# Patient Record
Sex: MALE | Race: ASIAN | NOT HISPANIC OR LATINO | Employment: FULL TIME | URBAN - METROPOLITAN AREA
[De-identification: names, ages, dates, MRNs, and addresses within clinical notes are randomized per-mention and may not be internally consistent; named-entity substitution may affect disease eponyms.]

---

## 2023-06-06 ENCOUNTER — OFFICE VISIT (OUTPATIENT)
Dept: FAMILY MEDICINE CLINIC | Facility: CLINIC | Age: 48
End: 2023-06-06
Payer: COMMERCIAL

## 2023-06-06 VITALS
SYSTOLIC BLOOD PRESSURE: 122 MMHG | DIASTOLIC BLOOD PRESSURE: 70 MMHG | HEART RATE: 56 BPM | WEIGHT: 150.2 LBS | TEMPERATURE: 98.1 F | OXYGEN SATURATION: 99 % | BODY MASS INDEX: 24.14 KG/M2 | HEIGHT: 66 IN

## 2023-06-06 DIAGNOSIS — Z12.5 SCREENING FOR PROSTATE CANCER: ICD-10-CM

## 2023-06-06 DIAGNOSIS — Z00.00 ANNUAL PHYSICAL EXAM: Primary | ICD-10-CM

## 2023-06-06 DIAGNOSIS — Z13.1 SCREENING FOR DIABETES MELLITUS: ICD-10-CM

## 2023-06-06 DIAGNOSIS — Z13.220 SCREENING FOR CHOLESTEROL LEVEL: ICD-10-CM

## 2023-06-06 DIAGNOSIS — Z13.29 SCREENING FOR THYROID DISORDER: ICD-10-CM

## 2023-06-06 DIAGNOSIS — Z12.11 SCREENING FOR COLON CANCER: ICD-10-CM

## 2023-06-06 DIAGNOSIS — N52.9 ERECTILE DYSFUNCTION, UNSPECIFIED ERECTILE DYSFUNCTION TYPE: ICD-10-CM

## 2023-06-06 DIAGNOSIS — Z11.59 NEED FOR HEPATITIS C SCREENING TEST: ICD-10-CM

## 2023-06-06 DIAGNOSIS — Z13.0 SCREENING FOR IRON DEFICIENCY ANEMIA: ICD-10-CM

## 2023-06-06 PROCEDURE — 99386 PREV VISIT NEW AGE 40-64: CPT | Performed by: FAMILY MEDICINE

## 2023-06-06 RX ORDER — SILDENAFIL 100 MG/1
TABLET, FILM COATED ORAL
Qty: 10 TABLET | Refills: 0 | Status: SHIPPED | OUTPATIENT
Start: 2023-06-06 | End: 2023-06-06

## 2023-06-06 RX ORDER — SILDENAFIL 50 MG/1
TABLET, FILM COATED ORAL
Qty: 10 TABLET | Refills: 0 | Status: SHIPPED | OUTPATIENT
Start: 2023-06-06

## 2023-06-06 RX ORDER — LEVOTHYROXINE SODIUM 112 UG/1
112 TABLET ORAL DAILY
COMMUNITY
Start: 2022-07-12 | End: 2023-06-14 | Stop reason: SDUPTHER

## 2023-06-06 NOTE — PROGRESS NOTES
ADULT ANNUAL [address] [place] [address] [facility]    NAME: Perla Rowan  AGE: 57 y o  SEX: male  : 1975     DATE: 2023     Assessment and Plan:     Problem List Items Addressed This Visit    None  Visit Diagnoses     Annual physical exam    -  Primary    Screening for colon cancer        Relevant Orders    Ambulatory Referral to Gastroenterology    Erectile dysfunction, unspecified erectile dysfunction type        Relevant Medications    sildenafil (VIAGRA) 50 MG tablet    Screening for iron deficiency anemia        Relevant Orders    CBC    Screening for diabetes mellitus        Relevant Orders    Comprehensive metabolic panel    Hemoglobin A1C With EAG    Screening for cholesterol level        Relevant Orders    Lipid Panel with Direct LDL reflex    Screening for prostate cancer        Relevant Orders    PSA, Total Screen    Screening for thyroid disorder        Relevant Orders    TSH, 3rd generation with Free T4 reflex    Need for hepatitis C screening test        Relevant Orders    Hepatitis C RNA, quantitative, PCR          Immunizations and preventive care screenings were discussed with patient today  Appropriate education was printed on patient's after visit summary  Discussed risks and benefits of prostate cancer screening  We discussed the controversial history of PSA screening for prostate cancer in the [place] as well as the risk of over detection and over treatment of prostate cancer by way of PSA screening  The patient understands that PSA blood testing is an imperfect way to screen for prostate cancer and that elevated PSA levels in the blood may also be caused by infection, inflammation, prostatic trauma or manipulation, urological procedures, or by benign prostatic enlargement      The role of the digital rectal examination in prostate cancer screening was also discussed and I discussed with him that there is large interobserver variability in the findings of digital rectal examination  Counseling:  Alcohol/drug use: discussed moderation in alcohol intake, the recommendations for healthy alcohol use, and avoidance of illicit drug use  Dental Health: discussed importance of regular tooth brushing, flossing, and dental visits  Injury prevention: discussed safety/seat belts, safety helmets, smoke detectors, carbon dioxide detectors, and smoking near bedding or upholstery  Sexual health: discussed sexually transmitted diseases, partner selection, use of condoms, avoidance of unintended pregnancy, and contraceptive alternatives  · Exercise: the importance of regular exercise/physical activity was discussed  Recommend exercise 3-5 times per week for at least 30 minutes  Return in 1 year (on 6/6/2024)  Chief Complaint:     Chief Complaint   Patient presents with   • Physical Exam      History of Present Illness:     Adult Annual Physical   Patient here for a comprehensive physical exam  The patient reports problems - erectiole dysfunction  Diet and Physical Activity  · Diet/Nutrition: well balanced diet  · Exercise: vigorous cardiovascular exercise and 5-7 times a week on average  Depression Screening  PHQ-2/9 Depression Screening    Little interest or pleasure in doing things: 0 - not at all  Feeling down, depressed, or hopeless: 0 - not at all  PHQ-2 Score: 0  PHQ-2 Interpretation: Negative depression screen       General Health  · Sleep: sleeps well  · Hearing: normal - bilateral   · Vision: goes for regular eye exams  · Dental: regular dental visits   Health  · Symptoms include: none erectile dysfunction     Review of Systems:     Review of Systems   Constitutional: Negative for activity change, chills, fatigue and fever  HENT: Negative for congestion, ear pain, sinus pressure and sore throat  Eyes: Negative for redness, itching and visual disturbance     Respiratory: Negative for cough and shortness of breath  Cardiovascular: Negative for chest pain and palpitations  Gastrointestinal: Negative for abdominal pain, diarrhea and nausea  Endocrine: Negative for cold intolerance and heat intolerance  Genitourinary: Negative for dysuria, flank pain and frequency  ED   Musculoskeletal: Negative for arthralgias, back pain, gait problem and myalgias  Skin: Negative for color change  Allergic/Immunologic: Negative for environmental allergies  Neurological: Negative for dizziness, numbness and headaches  Psychiatric/Behavioral: Negative for behavioral problems and sleep disturbance  Past Medical History:     History reviewed  No pertinent past medical history     Past Surgical History:     Past Surgical History:   Procedure Laterality Date   • WISDOM TOOTH EXTRACTION        Family History:     Family History   Problem Relation Age of Onset   • Hypertension Mother    • No Known Problems Father         Cannot walk due to drinking alcohol   • No Known Problems Sister    • No Known Problems Sister    • No Known Problems Brother    • No Known Problems Son    • No Known Problems Daughter    • No Known Problems Daughter       Social History:     Social History     Socioeconomic History   • Marital status: /Civil Union     Spouse name: None   • Number of children: None   • Years of education: None   • Highest education level: None   Occupational History   • None   Tobacco Use   • Smoking status: Never     Passive exposure: Never   • Smokeless tobacco: Never   Vaping Use   • Vaping Use: Never used   Substance and Sexual Activity   • Alcohol use: Never   • Drug use: Never   • Sexual activity: None   Other Topics Concern   • None   Social History Narrative   • None     Social Determinants of Health     Financial Resource Strain: Not on file   Food Insecurity: Not on file   Transportation Needs: Not on file   Physical Activity: Not on file   Stress: Not on file   Social Connections: Not on "file   Intimate Partner Violence: Not on file   Housing Stability: Not on file      Current Medications:     Current Outpatient Medications   Medication Sig Dispense Refill   • levothyroxine 112 mcg tablet Take 112 mcg by mouth daily     • sildenafil (VIAGRA) 50 MG tablet Take 1 tab PO 1 hour prior to Sexual activity PRN  Do not exceed 1 tab per day 10 tablet 0     No current facility-administered medications for this visit  Allergies:     No Known Allergies   Physical Exam:     /70 (BP Location: Left arm, Patient Position: Sitting, Cuff Size: Adult)   Pulse 56   Temp 98 1 °F (36 7 °C)   Ht 5' 6\" (1 676 m)   Wt 68 1 kg (150 lb 3 2 oz)   SpO2 99%   BMI 24 24 kg/m²     Physical Exam  Vitals reviewed  Constitutional:       General: He is not in acute distress  Appearance: He is well-developed  He is not diaphoretic  HENT:      Head: Normocephalic and atraumatic  No right periorbital erythema or left periorbital erythema  Right Ear: Tympanic membrane normal  No decreased hearing noted  Left Ear: Tympanic membrane normal  No decreased hearing noted  Nose: Nose normal       Right Sinus: No maxillary sinus tenderness or frontal sinus tenderness  Left Sinus: No maxillary sinus tenderness or frontal sinus tenderness  Mouth/Throat:      Lips: Pink  Mouth: Mucous membranes are moist       Pharynx: No oropharyngeal exudate  Tonsils: No tonsillar exudate or tonsillar abscesses  Eyes:      General: Lids are normal       Extraocular Movements: Extraocular movements intact  Conjunctiva/sclera: Conjunctivae normal       Pupils: Pupils are equal, round, and reactive to light  Neck:      Thyroid: No thyroid mass  Trachea: Trachea normal    Cardiovascular:      Rate and Rhythm: Normal rate and regular rhythm  Pulses: Normal pulses  Heart sounds: Normal heart sounds  No murmur heard  No friction rub  No gallop     Pulmonary:      Effort: Pulmonary " effort is normal  No respiratory distress  Breath sounds: Normal breath sounds  No wheezing or rales  Abdominal:      General: Bowel sounds are normal  There is no distension  Palpations: Abdomen is soft  There is no mass  Tenderness: There is no abdominal tenderness  There is no guarding  Musculoskeletal:         General: Normal range of motion  Cervical back: Normal range of motion and neck supple  Skin:     General: Skin is warm and dry  Coloration: Skin is not pale  Findings: No erythema or rash  Neurological:      Mental Status: He is alert and oriented to person, place, and time  Cranial Nerves: No cranial nerve deficit  Coordination: Coordination normal    Psychiatric:         Attention and Perception: Attention and perception normal          Mood and Affect: Mood and affect normal          Speech: Speech normal          Behavior: Behavior normal          Thought Content:  Thought content normal          Cognition and Memory: Cognition and memory normal          Judgment: Judgment normal           Ihab DO ESE Hernández [address]

## 2023-06-08 ENCOUNTER — TELEPHONE (OUTPATIENT)
Dept: FAMILY MEDICINE CLINIC | Facility: CLINIC | Age: 48
End: 2023-06-08

## 2023-06-12 DIAGNOSIS — R94.6 ABNORMAL THYROID FUNCTION TEST: Primary | ICD-10-CM

## 2023-06-13 LAB
ALBUMIN SERPL-MCNC: 4.5 G/DL (ref 3.6–5.1)
ALBUMIN/GLOB SERPL: 1.7 (CALC) (ref 1–2.5)
ALP SERPL-CCNC: 60 U/L (ref 36–130)
ALT SERPL-CCNC: 14 U/L (ref 9–46)
AST SERPL-CCNC: 16 U/L (ref 10–40)
BILIRUB SERPL-MCNC: 0.5 MG/DL (ref 0.2–1.2)
BUN SERPL-MCNC: 15 MG/DL (ref 7–25)
BUN/CREAT SERPL: NORMAL (CALC) (ref 6–22)
CALCIUM SERPL-MCNC: 9.3 MG/DL (ref 8.6–10.3)
CHLORIDE SERPL-SCNC: 106 MMOL/L (ref 98–110)
CHOLEST SERPL-MCNC: 169 MG/DL
CHOLEST/HDLC SERPL: 3.4 (CALC)
CO2 SERPL-SCNC: 27 MMOL/L (ref 20–32)
CREAT SERPL-MCNC: 0.94 MG/DL (ref 0.6–1.29)
ERYTHROCYTE [DISTWIDTH] IN BLOOD BY AUTOMATED COUNT: 15.2 % (ref 11–15)
EST. AVERAGE GLUCOSE BLD GHB EST-MCNC: 114 MG/DL
EST. AVERAGE GLUCOSE BLD GHB EST-SCNC: 6.3 MMOL/L
GFR/BSA.PRED SERPLBLD CYS-BASED-ARV: 101 ML/MIN/1.73M2
GLOBULIN SER CALC-MCNC: 2.6 G/DL (CALC) (ref 1.9–3.7)
GLUCOSE SERPL-MCNC: 90 MG/DL (ref 65–99)
HBA1C MFR BLD: 5.6 % OF TOTAL HGB
HCT VFR BLD AUTO: 39.8 % (ref 38.5–50)
HCV RNA SERPL NAA+PROBE-ACNC: NORMAL IU/ML
HCV RNA SERPL NAA+PROBE-LOG IU: NORMAL LOG IU/ML
HDLC SERPL-MCNC: 49 MG/DL
HGB BLD-MCNC: 13.4 G/DL (ref 13.2–17.1)
LDLC SERPL CALC-MCNC: 104 MG/DL (CALC)
MCH RBC QN AUTO: 26.2 PG (ref 27–33)
MCHC RBC AUTO-ENTMCNC: 33.7 G/DL (ref 32–36)
MCV RBC AUTO: 77.7 FL (ref 80–100)
NONHDLC SERPL-MCNC: 120 MG/DL (CALC)
PLATELET # BLD AUTO: 238 THOUSAND/UL (ref 140–400)
PMV BLD REES-ECKER: 11.2 FL (ref 7.5–12.5)
POTASSIUM SERPL-SCNC: 4.4 MMOL/L (ref 3.5–5.3)
PROT SERPL-MCNC: 7.1 G/DL (ref 6.1–8.1)
PSA SERPL-MCNC: 0.81 NG/ML
RBC # BLD AUTO: 5.12 MILLION/UL (ref 4.2–5.8)
SODIUM SERPL-SCNC: 140 MMOL/L (ref 135–146)
T4 FREE SERPL-MCNC: 1.7 NG/DL (ref 0.8–1.8)
TRIGL SERPL-MCNC: 72 MG/DL
TSH SERPL-ACNC: 0.08 MIU/L (ref 0.4–4.5)
WBC # BLD AUTO: 4.6 THOUSAND/UL (ref 3.8–10.8)

## 2023-06-14 DIAGNOSIS — E03.9 HYPOTHYROIDISM, UNSPECIFIED TYPE: Primary | ICD-10-CM

## 2023-06-14 RX ORDER — LEVOTHYROXINE SODIUM 0.1 MG/1
100 TABLET ORAL DAILY
Qty: 30 TABLET | Refills: 2 | Status: SHIPPED | OUTPATIENT
Start: 2023-06-14 | End: 2023-07-06

## 2023-06-15 ENCOUNTER — TELEPHONE (OUTPATIENT)
Dept: FAMILY MEDICINE CLINIC | Facility: CLINIC | Age: 48
End: 2023-06-15

## 2023-06-15 DIAGNOSIS — E03.9 HYPOTHYROIDISM, UNSPECIFIED TYPE: Primary | ICD-10-CM

## 2023-06-15 NOTE — TELEPHONE ENCOUNTER
Spoke w/pt  Pt stated that he was taking 112 mcg and that Dr Tiara Gandara already lowered it to 100 mcg yesterday and he already started on the 100 mcg

## 2023-06-15 NOTE — TELEPHONE ENCOUNTER
----- Message from Mark Banks DO sent at 6/14/2023  7:01 PM EDT -----  No, please have him stop his current dose of levothyroxine  Please confirm what exact dose this is first   He will need to start a lower dose  Thank you   ----- Message -----  From: Eileen Garrido RN  Sent: 6/14/2023   4:22 PM EDT  To: Darryl Hernández DO    Pt is informed  He just asked if he should continue taking his thyroid medication as currently prescribed   States he takes levothyroxine

## 2023-06-16 ENCOUNTER — APPOINTMENT (OUTPATIENT)
Dept: LAB | Facility: CLINIC | Age: 48
End: 2023-06-16
Payer: COMMERCIAL

## 2023-06-16 DIAGNOSIS — R94.6 ABNORMAL THYROID FUNCTION TEST: ICD-10-CM

## 2023-06-16 LAB
T4 FREE SERPL-MCNC: 1.47 NG/DL (ref 0.61–1.12)
TSH SERPL DL<=0.05 MIU/L-ACNC: 0.05 UIU/ML (ref 0.45–4.5)

## 2023-06-16 PROCEDURE — 84443 ASSAY THYROID STIM HORMONE: CPT

## 2023-06-16 PROCEDURE — 86376 MICROSOMAL ANTIBODY EACH: CPT

## 2023-06-16 PROCEDURE — 36415 COLL VENOUS BLD VENIPUNCTURE: CPT

## 2023-06-16 PROCEDURE — 86800 THYROGLOBULIN ANTIBODY: CPT

## 2023-06-16 PROCEDURE — 84439 ASSAY OF FREE THYROXINE: CPT

## 2023-06-17 DIAGNOSIS — E05.90 HYPERTHYROIDISM: Primary | ICD-10-CM

## 2023-06-17 LAB
THYROGLOB AB SERPL-ACNC: <1 IU/ML (ref 0–0.9)
THYROPEROXIDASE AB SERPL-ACNC: 158 IU/ML (ref 0–34)

## 2023-06-21 ENCOUNTER — OFFICE VISIT (OUTPATIENT)
Dept: FAMILY MEDICINE CLINIC | Facility: CLINIC | Age: 48
End: 2023-06-21
Payer: COMMERCIAL

## 2023-06-21 VITALS
DIASTOLIC BLOOD PRESSURE: 78 MMHG | OXYGEN SATURATION: 99 % | WEIGHT: 149.6 LBS | SYSTOLIC BLOOD PRESSURE: 104 MMHG | BODY MASS INDEX: 24.04 KG/M2 | HEIGHT: 66 IN | HEART RATE: 59 BPM | TEMPERATURE: 97.9 F

## 2023-06-21 DIAGNOSIS — E05.90 HYPERTHYROIDISM: Primary | ICD-10-CM

## 2023-06-21 PROCEDURE — 99213 OFFICE O/P EST LOW 20 MIN: CPT | Performed by: FAMILY MEDICINE

## 2023-06-21 NOTE — PROGRESS NOTES
Assessment/Plan:   1  Hyperthyroidism  Reviewed patient's blood work with him in depth  At this time, his TSH still appeared very low  He did have thyroid antibody testing which did was positive as well  At this time, he has been tolerating his lower dose of levothyroxine  Given his positive antibody testing, will refer patient to endocrinology for further evaluation  Contact information was given to him today  He does not appear to have any emergent symptoms  Follow-up if any symptoms worsen  - TSH, 3rd generation with Free T4 reflex; Future           Diagnoses and all orders for this visit:    Hyperthyroidism  -     TSH, 3rd generation with Free T4 reflex; Future          Subjective:       Chief Complaint   Patient presents with   • Follow-up     Discuss labs      Patient ID: Percy Cunningham is a 57 y o  male presents today for a follow-up to review his blood work  He states that he did lower his dose of levothyroxine  He states that he has had this problem in the past where he all of a sudden developed very low TSH  He currently has been having mild joint pains  He states that he usually notices this when he has a change in his thyroid medications  HPI    Review of Systems   Constitutional: Negative for activity change, chills, fatigue and fever  HENT: Negative for congestion, ear pain, sinus pressure and sore throat  Eyes: Negative for redness, itching and visual disturbance  Respiratory: Negative for cough and shortness of breath  Cardiovascular: Negative for chest pain and palpitations  Gastrointestinal: Negative for abdominal pain, diarrhea and nausea  Endocrine: Negative for cold intolerance and heat intolerance  Genitourinary: Negative for dysuria, flank pain and frequency  Musculoskeletal: Negative for arthralgias, back pain, gait problem and myalgias  Skin: Negative for color change  Allergic/Immunologic: Negative for environmental allergies     Neurological: Negative for "dizziness, numbness and headaches  Psychiatric/Behavioral: Negative for behavioral problems and sleep disturbance  The following portions of the patient's history were reviewed and updated as appropriate : past family history, past medical history, past social history and past surgical history  Current Outpatient Medications:   •  levothyroxine 100 mcg tablet, Take 1 tablet (100 mcg total) by mouth daily, Disp: 30 tablet, Rfl: 2  •  sildenafil (VIAGRA) 50 MG tablet, Take 1 tab PO 1 hour prior to Sexual activity PRN  Do not exceed 1 tab per day, Disp: 10 tablet, Rfl: 0         Objective:         Vitals:    06/21/23 0917   BP: 104/78   BP Location: Left arm   Patient Position: Sitting   Cuff Size: Adult   Pulse: 59   Temp: 97 9 °F (36 6 °C)   SpO2: 99%   Weight: 67 9 kg (149 lb 9 6 oz)   Height: 5' 6\" (1 676 m)     Physical Exam  Vitals reviewed  Constitutional:       Appearance: He is well-developed  HENT:      Head: Normocephalic and atraumatic  Nose: Nose normal       Mouth/Throat:      Pharynx: No oropharyngeal exudate  Eyes:      General: No scleral icterus  Right eye: No discharge  Left eye: No discharge  Pupils: Pupils are equal, round, and reactive to light  Neck:      Trachea: No tracheal deviation  Cardiovascular:      Rate and Rhythm: Normal rate and regular rhythm  Pulses:           Dorsalis pedis pulses are 2+ on the right side and 2+ on the left side  Posterior tibial pulses are 2+ on the right side and 2+ on the left side  Heart sounds: Normal heart sounds  No murmur heard  No friction rub  No gallop  Pulmonary:      Effort: Pulmonary effort is normal  No respiratory distress  Breath sounds: Normal breath sounds  No wheezing or rales  Abdominal:      General: Bowel sounds are normal  There is no distension  Palpations: Abdomen is soft  Tenderness: There is no abdominal tenderness  There is no guarding or rebound   " Musculoskeletal:         General: Normal range of motion  Cervical back: Normal range of motion and neck supple  Lymphadenopathy:      Head:      Right side of head: No submental or submandibular adenopathy  Left side of head: No submental or submandibular adenopathy  Cervical: No cervical adenopathy  Right cervical: No superficial, deep or posterior cervical adenopathy  Left cervical: No superficial, deep or posterior cervical adenopathy  Skin:     General: Skin is warm and dry  Findings: No erythema  Neurological:      Mental Status: He is alert and oriented to person, place, and time  Cranial Nerves: No cranial nerve deficit  Sensory: No sensory deficit  Psychiatric:         Mood and Affect: Mood is not anxious or depressed  Speech: Speech normal          Behavior: Behavior normal          Thought Content:  Thought content normal          Judgment: Judgment normal

## 2023-07-06 DIAGNOSIS — E03.9 HYPOTHYROIDISM, UNSPECIFIED TYPE: ICD-10-CM

## 2023-07-06 RX ORDER — LEVOTHYROXINE SODIUM 0.1 MG/1
TABLET ORAL
Qty: 90 TABLET | Refills: 1 | Status: SHIPPED | OUTPATIENT
Start: 2023-07-06

## 2023-07-26 ENCOUNTER — CONSULT (OUTPATIENT)
Dept: ENDOCRINOLOGY | Facility: CLINIC | Age: 48
End: 2023-07-26
Payer: COMMERCIAL

## 2023-07-26 ENCOUNTER — LAB (OUTPATIENT)
Dept: LAB | Facility: CLINIC | Age: 48
End: 2023-07-26
Payer: COMMERCIAL

## 2023-07-26 VITALS
WEIGHT: 146.13 LBS | SYSTOLIC BLOOD PRESSURE: 104 MMHG | HEIGHT: 66 IN | DIASTOLIC BLOOD PRESSURE: 80 MMHG | BODY MASS INDEX: 23.48 KG/M2 | HEART RATE: 56 BPM

## 2023-07-26 DIAGNOSIS — E05.90 HYPERTHYROIDISM: ICD-10-CM

## 2023-07-26 DIAGNOSIS — E06.3 HYPOTHYROIDISM DUE TO HASHIMOTO'S THYROIDITIS: Primary | ICD-10-CM

## 2023-07-26 DIAGNOSIS — E06.3 HYPOTHYROIDISM DUE TO HASHIMOTO'S THYROIDITIS: ICD-10-CM

## 2023-07-26 DIAGNOSIS — K59.00 CONSTIPATION, UNSPECIFIED CONSTIPATION TYPE: ICD-10-CM

## 2023-07-26 DIAGNOSIS — N52.9 ERECTILE DYSFUNCTION, UNSPECIFIED ERECTILE DYSFUNCTION TYPE: ICD-10-CM

## 2023-07-26 DIAGNOSIS — E03.8 HYPOTHYROIDISM DUE TO HASHIMOTO'S THYROIDITIS: Primary | ICD-10-CM

## 2023-07-26 DIAGNOSIS — E03.8 HYPOTHYROIDISM DUE TO HASHIMOTO'S THYROIDITIS: ICD-10-CM

## 2023-07-26 LAB
T4 FREE SERPL-MCNC: 1 NG/DL (ref 0.61–1.12)
T4 FREE SERPL-MCNC: 1.05 NG/DL (ref 0.61–1.12)
TSH SERPL DL<=0.05 MIU/L-ACNC: 0.4 UIU/ML (ref 0.45–4.5)

## 2023-07-26 PROCEDURE — 86364 TISS TRNSGLTMNASE EA IG CLAS: CPT

## 2023-07-26 PROCEDURE — 82784 ASSAY IGA/IGD/IGG/IGM EACH: CPT

## 2023-07-26 PROCEDURE — 36415 COLL VENOUS BLD VENIPUNCTURE: CPT

## 2023-07-26 PROCEDURE — 86231 EMA EACH IG CLASS: CPT

## 2023-07-26 PROCEDURE — 99244 OFF/OP CNSLTJ NEW/EST MOD 40: CPT | Performed by: INTERNAL MEDICINE

## 2023-07-26 PROCEDURE — 86258 DGP ANTIBODY EACH IG CLASS: CPT

## 2023-07-26 PROCEDURE — 84439 ASSAY OF FREE THYROXINE: CPT

## 2023-07-26 PROCEDURE — 84443 ASSAY THYROID STIM HORMONE: CPT

## 2023-07-26 RX ORDER — SILDENAFIL 50 MG/1
TABLET, FILM COATED ORAL
Qty: 10 TABLET | Refills: 0 | Status: SHIPPED | OUTPATIENT
Start: 2023-07-26

## 2023-07-26 NOTE — ASSESSMENT & PLAN NOTE
Thyroid hormone requirements are lower due to weight loss of about 20 pounds however TSH has not been stable for the past 2 years. For now repeat thyroid function test as she has been on a consistent dose for at least 5 to 6 weeks. Also screen for celiac disease.   Further management will depend on the results

## 2023-07-26 NOTE — PROGRESS NOTES
Charlie Ramsey 57 y.o. male MRN: [id number]    Encounter: [id number]      Assessment/Plan     Problem List Items Addressed This Visit        Endocrine    Hypothyroidism due to Hashimoto's thyroiditis - Primary     Thyroid hormone requirements are lower due to weight loss of about 20 pounds however TSH has not been stable for the past 2 years. For now repeat thyroid function test as she has been on a consistent dose for at least 5 to 6 weeks. Also screen for celiac disease. Further management will depend on the results         Relevant Orders    T4, free Lab Collect    TSH, 3rd generation Lab Collect       Other    Constipation     Screen for celiac disease         Relevant Orders    Celiac Disease Antibody Profile    Erectile dysfunction     Will check testosterone next visit        Other Visit Diagnoses     Hyperthyroidism            CC:   Low TSHLow tsh    History of Present Illness     HPI:  79-year-old male referred here for evaluation of low TSH. He was initially diagnosed with hypothyroidism 2013  - started on LT4 and dose has been gradually increased until he was taking 112 mcg daily for the past year or so. TSH in the past 2 years have ranged between 2-6. Labs in June showed a suppressed TSH and levothyroxine dose was decreased to 100 mcg daily 5 weeks back. Takes it regularly and properly and eats 30 mins - 1 hours before breakfast    Stopped etoh and started working out and intermittent fasting and last 20 lbs  In the past 8 months     No palpitations   Sleeping well   Sometimes feel shaky when he has not eaten   Someone feels on edge     C/o constipation - takes fiber and miralax     Muscle aches     ED 8-9 months ,no loss of libido , sometimes gets morning erections   marathon runner       Review of Systems    Historical Information   No past medical history on file.   Past Surgical History:   Procedure Laterality Date   • WISDOM TOOTH EXTRACTION       Social History   Social History Substance and Sexual Activity   Alcohol Use Never     Social History     Substance and Sexual Activity   Drug Use Never     Social History     Tobacco Use   Smoking Status Never   • Passive exposure: Never   Smokeless Tobacco Never     Family History:   Family History   Problem Relation Age of Onset   • Hypertension Mother    • No Known Problems Father         Cannot walk due to drinking alcohol   • Thyroid disease unspecified Sister    • No Known Problems Sister    • No Known Problems Brother    • No Known Problems Daughter    • No Known Problems Daughter    • No Known Problems Son        Meds/Allergies   Current Outpatient Medications   Medication Sig Dispense Refill   • levothyroxine 100 mcg tablet TAKE 1 TABLET BY MOUTH EVERY DAY 90 tablet 1   • sildenafil (VIAGRA) 50 MG tablet Take 1 tab PO 1 hour prior to Sexual activity PRN. Do not exceed 1 tab per day 10 tablet 0     No current facility-administered medications for this visit. No Known Allergies    Objective   Vitals: Blood pressure 104/80, pulse 56, height 5' 6" (1.676 m), weight 66.3 kg (146 lb 2 oz). Physical Exam  Vitals reviewed. Constitutional:       General: He is not in acute distress. Appearance: Normal appearance. He is not ill-appearing, toxic-appearing or diaphoretic. HENT:      Head: Normocephalic and atraumatic. Eyes:      General: No scleral icterus. Extraocular Movements: Extraocular movements intact. Cardiovascular:      Rate and Rhythm: Normal rate and regular rhythm. Heart sounds: Normal heart sounds. No murmur heard. Pulmonary:      Effort: Pulmonary effort is normal. No respiratory distress. Breath sounds: Normal breath sounds. No wheezing or rales. Abdominal:      General: There is no distension. Palpations: Abdomen is soft. Tenderness: There is no abdominal tenderness. Musculoskeletal:      Cervical back: Neck supple. Right lower leg: No edema. Left lower leg: No edema. Lymphadenopathy:      Cervical: No cervical adenopathy. Skin:     General: Skin is warm and dry. Neurological:      General: No focal deficit present. Mental Status: He is alert and oriented to person, place, and time. Gait: Gait normal.   Psychiatric:         Mood and Affect: Mood normal.         Behavior: Behavior normal.         Thought Content: Thought content normal.         Judgment: Judgment normal.         The history was obtained from the review of the chart, patient. Lab Results:   Lab Results   Component Value Date/Time    TSH 3RD GENERATON 0.048 (L) 06/16/2023 08:14 AM    TSH W/RFX TO FREE T4 0.08 (L) 06/12/2023 08:25 AM    Free T4 1.47 (H) 06/16/2023 08:14 AM    Free t4 1.7 06/12/2023 08:25 AM     X  Component 12/28/22  07/09/22  10/14/21  08/24/21    Thyroid Stimulating Hormone 1.98 4.77 High  2.15 6.94 High        Imaging Studies:     Portions of the record may have been created with voice recognition software. Occasional wrong word or "sound a like" substitutions may have occurred due to the inherent limitations of voice recognition software. Read the chart carefully and recognize, using context, where substitutions have occurred.

## 2023-07-27 DIAGNOSIS — E06.3 HYPOTHYROIDISM DUE TO HASHIMOTO'S THYROIDITIS: Primary | ICD-10-CM

## 2023-07-27 DIAGNOSIS — E03.8 HYPOTHYROIDISM DUE TO HASHIMOTO'S THYROIDITIS: Primary | ICD-10-CM

## 2023-07-27 NOTE — RESULT ENCOUNTER NOTE
Please call the patient regarding labs -TSH has improved, continue levothyroxine at current dose for now, celiac screen is pending  Repeat TSH and free T4 in the next 6 to 8 weeks-Will order labs

## 2023-07-29 LAB
ENDOMYSIUM IGA SER QL: NEGATIVE
GLIADIN PEPTIDE IGA SER-ACNC: 4 UNITS (ref 0–19)
GLIADIN PEPTIDE IGG SER-ACNC: 1 UNITS (ref 0–19)
IGA SERPL-MCNC: 165 MG/DL (ref 90–386)
TTG IGA SER-ACNC: <2 U/ML (ref 0–3)
TTG IGG SER-ACNC: 3 U/ML (ref 0–5)

## 2023-09-01 ENCOUNTER — LAB (OUTPATIENT)
Dept: LAB | Facility: CLINIC | Age: 48
End: 2023-09-01
Payer: COMMERCIAL

## 2023-09-01 DIAGNOSIS — E03.8 HYPOTHYROIDISM DUE TO HASHIMOTO'S THYROIDITIS: ICD-10-CM

## 2023-09-01 DIAGNOSIS — E06.3 HYPOTHYROIDISM DUE TO HASHIMOTO'S THYROIDITIS: ICD-10-CM

## 2023-09-01 DIAGNOSIS — E03.8 HYPOTHYROIDISM DUE TO HASHIMOTO'S THYROIDITIS: Primary | ICD-10-CM

## 2023-09-01 DIAGNOSIS — E06.3 HYPOTHYROIDISM DUE TO HASHIMOTO'S THYROIDITIS: Primary | ICD-10-CM

## 2023-09-01 LAB
T4 FREE SERPL-MCNC: 1.01 NG/DL (ref 0.61–1.12)
TSH SERPL DL<=0.05 MIU/L-ACNC: 0.66 UIU/ML (ref 0.45–4.5)

## 2023-09-01 PROCEDURE — 84439 ASSAY OF FREE THYROXINE: CPT

## 2023-09-01 PROCEDURE — 36415 COLL VENOUS BLD VENIPUNCTURE: CPT

## 2023-09-01 PROCEDURE — 84443 ASSAY THYROID STIM HORMONE: CPT

## 2023-09-01 NOTE — RESULT ENCOUNTER NOTE
Tsh improved , continue levothyroxine at current dose , repeat tsh and free t4 in 3 months .  Labs ordered

## 2023-10-10 DIAGNOSIS — E03.9 HYPOTHYROIDISM, UNSPECIFIED TYPE: ICD-10-CM

## 2023-10-10 RX ORDER — LEVOTHYROXINE SODIUM 0.1 MG/1
100 TABLET ORAL DAILY
Qty: 90 TABLET | Refills: 0 | Status: SHIPPED | OUTPATIENT
Start: 2023-10-10

## 2024-01-05 ENCOUNTER — OFFICE VISIT (OUTPATIENT)
Dept: ENDOCRINOLOGY | Facility: CLINIC | Age: 49
End: 2024-01-05
Payer: COMMERCIAL

## 2024-01-05 ENCOUNTER — APPOINTMENT (OUTPATIENT)
Dept: LAB | Facility: CLINIC | Age: 49
End: 2024-01-05
Payer: COMMERCIAL

## 2024-01-05 VITALS
HEIGHT: 66 IN | HEART RATE: 61 BPM | BODY MASS INDEX: 23.66 KG/M2 | DIASTOLIC BLOOD PRESSURE: 62 MMHG | OXYGEN SATURATION: 98 % | SYSTOLIC BLOOD PRESSURE: 98 MMHG | WEIGHT: 147.2 LBS

## 2024-01-05 DIAGNOSIS — E06.3 HYPOTHYROIDISM DUE TO HASHIMOTO'S THYROIDITIS: Primary | ICD-10-CM

## 2024-01-05 DIAGNOSIS — M79.10 MYALGIA: ICD-10-CM

## 2024-01-05 DIAGNOSIS — E03.8 HYPOTHYROIDISM DUE TO HASHIMOTO'S THYROIDITIS: Primary | ICD-10-CM

## 2024-01-05 PROBLEM — R53.83 OTHER FATIGUE: Status: ACTIVE | Noted: 2024-01-05

## 2024-01-05 LAB
25(OH)D3 SERPL-MCNC: 27 NG/ML (ref 30–100)
T4 FREE SERPL-MCNC: 1 NG/DL (ref 0.61–1.12)
TSH SERPL DL<=0.05 MIU/L-ACNC: 1.79 UIU/ML (ref 0.45–4.5)

## 2024-01-05 PROCEDURE — 99214 OFFICE O/P EST MOD 30 MIN: CPT | Performed by: INTERNAL MEDICINE

## 2024-01-05 PROCEDURE — 82306 VITAMIN D 25 HYDROXY: CPT | Performed by: INTERNAL MEDICINE

## 2024-01-05 PROCEDURE — 84439 ASSAY OF FREE THYROXINE: CPT | Performed by: INTERNAL MEDICINE

## 2024-01-05 PROCEDURE — 84443 ASSAY THYROID STIM HORMONE: CPT | Performed by: INTERNAL MEDICINE

## 2024-01-05 PROCEDURE — 36415 COLL VENOUS BLD VENIPUNCTURE: CPT | Performed by: INTERNAL MEDICINE

## 2024-01-05 NOTE — PROGRESS NOTES
Bryn Conroy 48 y.o. male MRN: [id number]    Encounter: [id number]      Assessment/Plan   Problem List Items Addressed This Visit          Endocrine    Hypothyroidism due to Hashimoto's thyroiditis - Primary     Will repeat thyroid function tests now-depending on the labs he may need adjustment in his dose         Relevant Orders    TSH, 3rd generation    T4, free       Other    Myalgia     Will check vitamin D 25-hydroxy         Relevant Orders    Vitamin D 25 hydroxy      CC:   Hypothyroidism    History of Present Illness     HPI:  48-year-old male with hypothyroidism due to Hashimoto thyroiditis seen in follow-up-      He is currently on LT4 100 MCG DAILY and has been taking it regularly and properly  Weight stable   C/o skin itching   No OTC supplementations   Not sleeping well for the past couple of weeks   No palpitations   Constipation   Complains of occasional arthralgias and myalgias    Review of Systems    Historical Information   History reviewed. No pertinent past medical history.  Past Surgical History:   Procedure Laterality Date    WISDOM TOOTH EXTRACTION       Social History   Social History     Substance and Sexual Activity   Alcohol Use Never     Social History     Substance and Sexual Activity   Drug Use Never     Social History     Tobacco Use   Smoking Status Never    Passive exposure: Never   Smokeless Tobacco Never     Family History:   Family History   Problem Relation Age of Onset    Hypertension Mother     No Known Problems Father         Cannot walk due to drinking alcohol    Thyroid disease unspecified Sister     No Known Problems Sister     No Known Problems Brother     No Known Problems Daughter     No Known Problems Daughter     No Known Problems Son        Meds/Allergies   Current Outpatient Medications   Medication Sig Dispense Refill    levothyroxine 100 mcg tablet Take 1 tablet (100 mcg total) by mouth daily 90 tablet 0    sildenafil (VIAGRA) 50 MG tablet Take 1 tab PO 1 hour  "prior to Sexual activity PRN.  Do not exceed 1 tab per day 10 tablet 0     No current facility-administered medications for this visit.     No Known Allergies    Objective   Vitals: Blood pressure 98/62, pulse 61, height 5' 6\" (1.676 m), weight 66.8 kg (147 lb 3.2 oz), SpO2 98%.    Physical Exam  Vitals reviewed.   Constitutional:       General: He is not in acute distress.     Appearance: Normal appearance. He is not ill-appearing, toxic-appearing or diaphoretic.   HENT:      Head: Normocephalic and atraumatic.   Eyes:      General: No scleral icterus.     Extraocular Movements: Extraocular movements intact.   Cardiovascular:      Rate and Rhythm: Normal rate and regular rhythm.      Heart sounds: Normal heart sounds. No murmur heard.  Pulmonary:      Effort: Pulmonary effort is normal. No respiratory distress.      Breath sounds: Normal breath sounds. No wheezing or rales.   Abdominal:      General: There is no distension.      Palpations: Abdomen is soft.      Tenderness: There is no abdominal tenderness.   Musculoskeletal:      Cervical back: Neck supple.      Right lower leg: No edema.      Left lower leg: No edema.   Lymphadenopathy:      Cervical: No cervical adenopathy.   Skin:     General: Skin is warm and dry.   Neurological:      General: No focal deficit present.      Mental Status: He is alert and oriented to person, place, and time.      Gait: Gait normal.   Psychiatric:         Mood and Affect: Mood normal.         Behavior: Behavior normal.         Thought Content: Thought content normal.         Judgment: Judgment normal.         The history was obtained from the review of the chart, patient.    Lab Results:   Lab Results   Component Value Date/Time    TSH 3RD GENERATON 0.657 09/01/2023 07:35 AM    TSH 3RD GENERATON 0.399 (L) 07/26/2023 10:05 AM    TSH 3RD GENERATON 0.048 (L) 06/16/2023 08:14 AM    TSH W/RFX TO FREE T4 0.08 (L) 06/12/2023 08:25 AM    Free T4 1.01 09/01/2023 07:35 AM    Free T4 1.05 " "07/26/2023 10:05 AM    Free T4 1.00 07/26/2023 10:05 AM       Imaging Studies:       I have personally reviewed pertinent reports.      Portions of the record may have been created with voice recognition software. Occasional wrong word or \"sound a like\" substitutions may have occurred due to the inherent limitations of voice recognition software. Read the chart carefully and recognize, using context, where substitutions have occurred.    "

## 2024-01-07 NOTE — RESULT ENCOUNTER NOTE
Please call the patient regarding labs -TSH normalized-continue levothyroxine at current dose-low vitamin D-start vitamin D3 2000 international units daily

## 2024-01-08 DIAGNOSIS — E03.9 HYPOTHYROIDISM, UNSPECIFIED TYPE: ICD-10-CM

## 2024-01-08 RX ORDER — LEVOTHYROXINE SODIUM 0.1 MG/1
100 TABLET ORAL DAILY
Qty: 90 TABLET | Refills: 0 | Status: SHIPPED | OUTPATIENT
Start: 2024-01-08

## 2024-01-08 NOTE — TELEPHONE ENCOUNTER
----- Message from Kemi Tripathi MD sent at 1/7/2024  5:05 PM EST -----  Please call the patient regarding labs -TSH normalized-continue levothyroxine at current dose-low vitamin D-start vitamin D3 2000 international units daily

## 2024-03-27 ENCOUNTER — OFFICE VISIT (OUTPATIENT)
Dept: FAMILY MEDICINE CLINIC | Facility: CLINIC | Age: 49
End: 2024-03-27
Payer: COMMERCIAL

## 2024-03-27 ENCOUNTER — APPOINTMENT (OUTPATIENT)
Dept: LAB | Facility: CLINIC | Age: 49
End: 2024-03-27
Payer: COMMERCIAL

## 2024-03-27 VITALS
TEMPERATURE: 98 F | DIASTOLIC BLOOD PRESSURE: 68 MMHG | HEIGHT: 67 IN | WEIGHT: 151 LBS | RESPIRATION RATE: 16 BRPM | BODY MASS INDEX: 23.7 KG/M2 | OXYGEN SATURATION: 98 % | SYSTOLIC BLOOD PRESSURE: 100 MMHG | HEART RATE: 49 BPM

## 2024-03-27 DIAGNOSIS — E55.9 VITAMIN D INSUFFICIENCY: ICD-10-CM

## 2024-03-27 DIAGNOSIS — Z12.11 COLON CANCER SCREENING: ICD-10-CM

## 2024-03-27 DIAGNOSIS — R71.8 LOW MEAN CORPUSCULAR VOLUME (MCV): ICD-10-CM

## 2024-03-27 DIAGNOSIS — Z00.00 ANNUAL PHYSICAL EXAM: ICD-10-CM

## 2024-03-27 DIAGNOSIS — Z12.5 SCREENING FOR PROSTATE CANCER: ICD-10-CM

## 2024-03-27 DIAGNOSIS — Z13.1 SCREENING FOR DIABETES MELLITUS: ICD-10-CM

## 2024-03-27 DIAGNOSIS — L50.9 URTICARIA: ICD-10-CM

## 2024-03-27 DIAGNOSIS — Z23 ENCOUNTER FOR IMMUNIZATION: Primary | ICD-10-CM

## 2024-03-27 DIAGNOSIS — Z13.220 SCREENING FOR CHOLESTEROL LEVEL: ICD-10-CM

## 2024-03-27 LAB
25(OH)D3 SERPL-MCNC: 45.1 NG/ML (ref 30–100)
ALBUMIN SERPL BCP-MCNC: 4.3 G/DL (ref 3.5–5)
ALP SERPL-CCNC: 45 U/L (ref 34–104)
ALT SERPL W P-5'-P-CCNC: 17 U/L (ref 7–52)
ANION GAP SERPL CALCULATED.3IONS-SCNC: 6 MMOL/L (ref 4–13)
AST SERPL W P-5'-P-CCNC: 16 U/L (ref 13–39)
BILIRUB SERPL-MCNC: 0.62 MG/DL (ref 0.2–1)
BUN SERPL-MCNC: 13 MG/DL (ref 5–25)
CALCIUM SERPL-MCNC: 9.1 MG/DL (ref 8.4–10.2)
CHLORIDE SERPL-SCNC: 105 MMOL/L (ref 96–108)
CHOLEST SERPL-MCNC: 173 MG/DL
CO2 SERPL-SCNC: 30 MMOL/L (ref 21–32)
CREAT SERPL-MCNC: 0.83 MG/DL (ref 0.6–1.3)
ERYTHROCYTE [DISTWIDTH] IN BLOOD BY AUTOMATED COUNT: 15.5 % (ref 11.6–15.1)
EST. AVERAGE GLUCOSE BLD GHB EST-MCNC: 120 MG/DL
FERRITIN SERPL-MCNC: 7 NG/ML (ref 24–336)
GFR SERPL CREATININE-BSD FRML MDRD: 104 ML/MIN/1.73SQ M
GLUCOSE P FAST SERPL-MCNC: 93 MG/DL (ref 65–99)
HBA1C MFR BLD: 5.8 %
HCT VFR BLD AUTO: 42.5 % (ref 36.5–49.3)
HDLC SERPL-MCNC: 55 MG/DL
HGB BLD-MCNC: 12.9 G/DL (ref 12–17)
IRON SATN MFR SERPL: 17 % (ref 15–50)
IRON SERPL-MCNC: 73 UG/DL (ref 50–212)
LDLC SERPL CALC-MCNC: 102 MG/DL (ref 0–100)
MCH RBC QN AUTO: 25.1 PG (ref 26.8–34.3)
MCHC RBC AUTO-ENTMCNC: 30.4 G/DL (ref 31.4–37.4)
MCV RBC AUTO: 83 FL (ref 82–98)
PLATELET # BLD AUTO: 188 THOUSANDS/UL (ref 149–390)
PMV BLD AUTO: 10.9 FL (ref 8.9–12.7)
POTASSIUM SERPL-SCNC: 4.5 MMOL/L (ref 3.5–5.3)
PROT SERPL-MCNC: 6.7 G/DL (ref 6.4–8.4)
RBC # BLD AUTO: 5.13 MILLION/UL (ref 3.88–5.62)
SODIUM SERPL-SCNC: 141 MMOL/L (ref 135–147)
TIBC SERPL-MCNC: 428 UG/DL (ref 250–450)
TRIGL SERPL-MCNC: 81 MG/DL
UIBC SERPL-MCNC: 355 UG/DL (ref 155–355)
WBC # BLD AUTO: 5.05 THOUSAND/UL (ref 4.31–10.16)

## 2024-03-27 PROCEDURE — 99396 PREV VISIT EST AGE 40-64: CPT | Performed by: FAMILY MEDICINE

## 2024-03-27 PROCEDURE — 85027 COMPLETE CBC AUTOMATED: CPT

## 2024-03-27 PROCEDURE — 83540 ASSAY OF IRON: CPT

## 2024-03-27 PROCEDURE — 80053 COMPREHEN METABOLIC PANEL: CPT

## 2024-03-27 PROCEDURE — 83036 HEMOGLOBIN GLYCOSYLATED A1C: CPT

## 2024-03-27 PROCEDURE — 36415 COLL VENOUS BLD VENIPUNCTURE: CPT

## 2024-03-27 PROCEDURE — 83550 IRON BINDING TEST: CPT

## 2024-03-27 PROCEDURE — 82306 VITAMIN D 25 HYDROXY: CPT

## 2024-03-27 PROCEDURE — 90715 TDAP VACCINE 7 YRS/> IM: CPT

## 2024-03-27 PROCEDURE — 82728 ASSAY OF FERRITIN: CPT

## 2024-03-27 PROCEDURE — 90471 IMMUNIZATION ADMIN: CPT

## 2024-03-27 PROCEDURE — 80061 LIPID PANEL: CPT

## 2024-03-27 NOTE — PROGRESS NOTES
ADULT ANNUAL PHYSICAL  [facility]    NAME: Bryn Conroy  AGE: 49 y.o. SEX: male  : 1975     DATE: 3/27/2024     Assessment and Plan:     Problem List Items Addressed This Visit    None  Visit Diagnoses       Encounter for immunization    -  Primary    Relevant Orders    TDAP VACCINE GREATER THAN OR EQUAL TO 8YO IM (Completed)    Annual physical exam        Colon cancer screening        Relevant Orders    Ambulatory Referral to Gastroenterology    Urticaria        Relevant Orders    Ambulatory Referral to Allergy    Low mean corpuscular volume (MCV)        Relevant Orders    CBC    Iron Panel (Includes Ferritin, Iron Sat%, Iron, and TIBC)    Screening for diabetes mellitus        Relevant Orders    Comprehensive metabolic panel    Hemoglobin A1C    Screening for cholesterol level        Relevant Orders    Lipid Panel with Direct LDL reflex    Screening for prostate cancer        Vitamin D insufficiency        Relevant Orders    Vitamin D 25 hydroxy            Immunizations and preventive care screenings were discussed with patient today. Appropriate education was printed on patient's after visit summary.    Discussed risks and benefits of prostate cancer screening. We discussed the controversial history of PSA screening for prostate cancer in the [place] as well as the risk of over detection and over treatment of prostate cancer by way of PSA screening.  The patient understands that PSA blood testing is an imperfect way to screen for prostate cancer and that elevated PSA levels in the blood may also be caused by infection, inflammation, prostatic trauma or manipulation, urological procedures, or by benign prostatic enlargement.    The role of the digital rectal examination in prostate cancer screening was also discussed and I discussed with him that there is large interobserver variability in the findings of digital rectal  examination.    Counseling:  Alcohol/drug use: discussed moderation in alcohol intake, the recommendations for healthy alcohol use, and avoidance of illicit drug use.  Dental Health: discussed importance of regular tooth brushing, flossing, and dental visits.  Injury prevention: discussed safety/seat belts, safety helmets, smoke detectors, carbon dioxide detectors, and smoking near bedding or upholstery.  Sexual health: discussed sexually transmitted diseases, partner selection, use of condoms, avoidance of unintended pregnancy, and contraceptive alternatives.  Exercise: the importance of regular exercise/physical activity was discussed. Recommend exercise 3-5 times per week for at least 30 minutes.          Return in 1 year (on 3/27/2025).     Chief Complaint:     Chief Complaint   Patient presents with    Physical Exam      History of Present Illness:     Adult Annual Physical   Patient here for a comprehensive physical exam. The patient reports problems - urticaria .    Diet and Physical Activity  Diet/Nutrition: well balanced diet.   Exercise: vigorous cardiovascular exercise and 5-7 times a week on average.      Depression Screening  PHQ-2/9 Depression Screening    Little interest or pleasure in doing things: 0 - not at all  Feeling down, depressed, or hopeless: 0 - not at all  PHQ-2 Score: 0  PHQ-2 Interpretation: Negative depression screen       General Health  Sleep: sleeps well.   Hearing: normal - bilateral.  Vision: goes for regular eye exams and wears glasses.   Dental: no dental visits for >1 year.        Health  Symptoms include: none       Review of Systems:     Review of Systems   Constitutional:  Negative for activity change, chills, fatigue and fever.   HENT:  Negative for congestion, ear pain, sinus pressure and sore throat.    Eyes:  Negative for redness, itching and visual disturbance.   Respiratory:  Negative for cough and shortness of breath.    Cardiovascular:  Negative for chest pain and  palpitations.   Gastrointestinal:  Negative for abdominal pain, diarrhea and nausea.   Endocrine: Negative for cold intolerance and heat intolerance.   Genitourinary:  Negative for dysuria, flank pain and frequency.   Musculoskeletal:  Negative for arthralgias, back pain, gait problem and myalgias.   Skin:  Negative for color change.   Allergic/Immunologic: Negative for environmental allergies.   Neurological:  Negative for dizziness, numbness and headaches.   Psychiatric/Behavioral:  Negative for behavioral problems and sleep disturbance.       Past Medical History:     No past medical history on file.   Past Surgical History:     Past Surgical History:   Procedure Laterality Date    WISDOM TOOTH EXTRACTION        Family History:     Family History   Problem Relation Age of Onset    Hypertension Mother     No Known Problems Father         Cannot walk due to drinking alcohol    Thyroid disease unspecified Sister     No Known Problems Sister     No Known Problems Brother     No Known Problems Daughter     No Known Problems Daughter     No Known Problems Son       Social History:     Social History     Socioeconomic History    Marital status: /Civil Union     Spouse name: None    Number of children: None    Years of education: None    Highest education level: None   Occupational History    None   Tobacco Use    Smoking status: Never     Passive exposure: Never    Smokeless tobacco: Never   Vaping Use    Vaping status: Never Used   Substance and Sexual Activity    Alcohol use: Never    Drug use: Never    Sexual activity: None   Other Topics Concern    None   Social History Narrative    None     Social Determinants of Health     Financial Resource Strain: Not on file   Food Insecurity: Not on file   Transportation Needs: Not on file   Physical Activity: Not on file   Stress: Not on file   Social Connections: Not on file   Intimate Partner Violence: Unknown (11/21/2020)    Received from [facility]     "Intimate Partner Violence     Within the last year, have you been afraid of your partner, ex-partner or family member?: Not on file     Within the last year, have you been humiliated or emotionally abused in other ways by your partner, ex-partner or family member?: Not on file     Within the last year, have you been kicked, hit, slapped, or otherwise physically hurt by your partner, ex-partner or family member?: Not on file     Within the last year, have you been raped or forced to have any kind of sexual activity by your partner, ex-partner or family member?: Not on file   Housing Stability: Not on file      Current Medications:     Current Outpatient Medications   Medication Sig Dispense Refill    levothyroxine 100 mcg tablet Take 1 tablet (100 mcg total) by mouth daily 90 tablet 0    sildenafil (VIAGRA) 50 MG tablet Take 1 tab PO 1 hour prior to Sexual activity PRN.  Do not exceed 1 tab per day 10 tablet 0     No current facility-administered medications for this visit.      Allergies:     No Known Allergies   Physical Exam:     /68 (BP Location: Left arm, Patient Position: Sitting, Cuff Size: Standard)   Pulse (!) 49   Temp 98 °F (36.7 °C) (Temporal)   Resp 16   Ht 5' 6.5\" (1.689 m)   Wt 68.5 kg (151 lb)   SpO2 98%   BMI 24.01 kg/m²     Physical Exam  Vitals reviewed.   Constitutional:       General: He is not in acute distress.     Appearance: He is well-developed. He is not ill-appearing, toxic-appearing or diaphoretic.   HENT:      Head: Normocephalic and atraumatic. No right periorbital erythema or left periorbital erythema.      Jaw: There is normal jaw occlusion.      Right Ear: Hearing and tympanic membrane normal. No decreased hearing noted.      Left Ear: Hearing and tympanic membrane normal. No decreased hearing noted.      Nose: Nose normal.      Right Sinus: No maxillary sinus tenderness or frontal sinus tenderness.      Left Sinus: No maxillary sinus tenderness or frontal sinus " tenderness.      Mouth/Throat:      Lips: Pink.      Mouth: Mucous membranes are moist.      Pharynx: No oropharyngeal exudate.      Tonsils: No tonsillar exudate or tonsillar abscesses.   Eyes:      General: Lids are normal.      Extraocular Movements: Extraocular movements intact.      Right eye: Normal extraocular motion.      Left eye: Normal extraocular motion.      Conjunctiva/sclera: Conjunctivae normal.      Right eye: Right conjunctiva is not injected.      Left eye: Left conjunctiva is not injected.      Pupils: Pupils are equal, round, and reactive to light.   Neck:      Thyroid: No thyroid mass.      Trachea: Trachea normal. No tracheal deviation.   Cardiovascular:      Rate and Rhythm: Normal rate and regular rhythm.      Pulses: Normal pulses.      Heart sounds: Normal heart sounds. No murmur heard.     No friction rub. No gallop.   Pulmonary:      Effort: Pulmonary effort is normal. No respiratory distress.      Breath sounds: Normal breath sounds. No wheezing or rales.   Abdominal:      General: Bowel sounds are normal. There is no distension.      Palpations: Abdomen is soft. There is no mass.      Tenderness: There is no abdominal tenderness. There is no guarding.   Musculoskeletal:         General: Normal range of motion.      Cervical back: Normal range of motion and neck supple.   Skin:     General: Skin is warm and dry.      Coloration: Skin is not pale.      Findings: No erythema or rash.   Neurological:      Mental Status: He is alert and oriented to person, place, and time.      Cranial Nerves: No cranial nerve deficit.      Coordination: Coordination normal.   Psychiatric:         Attention and Perception: Attention and perception normal.         Mood and Affect: Mood and affect normal.         Speech: Speech normal.         Behavior: Behavior normal.         Thought Content: Thought content normal.         Cognition and Memory: Cognition and memory normal.         Judgment: Judgment  normal.          Darryl Hernández DO  [facility]

## 2024-04-04 ENCOUNTER — TELEPHONE (OUTPATIENT)
Age: 49
End: 2024-04-04

## 2024-04-09 DIAGNOSIS — E03.9 HYPOTHYROIDISM, UNSPECIFIED TYPE: ICD-10-CM

## 2024-04-09 RX ORDER — LEVOTHYROXINE SODIUM 0.1 MG/1
100 TABLET ORAL DAILY
Qty: 30 TABLET | Refills: 5 | Status: SHIPPED | OUTPATIENT
Start: 2024-04-09

## 2024-05-31 ENCOUNTER — TELEPHONE (OUTPATIENT)
Dept: FAMILY MEDICINE CLINIC | Facility: CLINIC | Age: 49
End: 2024-05-31

## 2024-05-31 DIAGNOSIS — E55.9 VITAMIN D INSUFFICIENCY: ICD-10-CM

## 2024-05-31 DIAGNOSIS — Z13.0 SCREENING FOR IRON DEFICIENCY ANEMIA: ICD-10-CM

## 2024-05-31 DIAGNOSIS — R94.6 ABNORMAL THYROID FUNCTION TEST: ICD-10-CM

## 2024-05-31 DIAGNOSIS — Z13.1 SCREENING FOR DIABETES MELLITUS: Primary | ICD-10-CM

## 2024-05-31 DIAGNOSIS — E03.9 HYPOTHYROIDISM, UNSPECIFIED TYPE: ICD-10-CM

## 2024-05-31 DIAGNOSIS — Z13.220 SCREENING FOR CHOLESTEROL LEVEL: ICD-10-CM

## 2024-05-31 DIAGNOSIS — Z12.11 SCREENING FOR COLON CANCER: ICD-10-CM

## 2024-05-31 DIAGNOSIS — R71.8 LOW MEAN CORPUSCULAR VOLUME (MCV): ICD-10-CM

## 2024-05-31 NOTE — TELEPHONE ENCOUNTER
Pt stopped in asking for lab orders to be places. Pt wanted to make sure that his thyroid and iron get checked. Pt uses [facility]. Pt asked to be called when orders are placed.

## 2024-06-03 ENCOUNTER — APPOINTMENT (OUTPATIENT)
Dept: LAB | Facility: CLINIC | Age: 49
End: 2024-06-03
Payer: COMMERCIAL

## 2024-06-03 DIAGNOSIS — Z13.0 SCREENING FOR IRON DEFICIENCY ANEMIA: ICD-10-CM

## 2024-06-03 DIAGNOSIS — R94.6 ABNORMAL THYROID FUNCTION TEST: ICD-10-CM

## 2024-06-03 DIAGNOSIS — Z12.11 SCREENING FOR COLON CANCER: ICD-10-CM

## 2024-06-03 DIAGNOSIS — E03.9 HYPOTHYROIDISM, UNSPECIFIED TYPE: ICD-10-CM

## 2024-06-03 DIAGNOSIS — Z13.220 SCREENING FOR CHOLESTEROL LEVEL: ICD-10-CM

## 2024-06-03 DIAGNOSIS — R71.8 LOW MEAN CORPUSCULAR VOLUME (MCV): ICD-10-CM

## 2024-06-03 DIAGNOSIS — Z13.1 SCREENING FOR DIABETES MELLITUS: ICD-10-CM

## 2024-06-03 DIAGNOSIS — E55.9 VITAMIN D INSUFFICIENCY: ICD-10-CM

## 2024-06-03 LAB
FERRITIN SERPL-MCNC: 26 NG/ML (ref 24–336)
IRON SATN MFR SERPL: 64 % (ref 15–50)
IRON SERPL-MCNC: 245 UG/DL (ref 50–212)
TIBC SERPL-MCNC: 381 UG/DL (ref 250–450)
TSH SERPL DL<=0.05 MIU/L-ACNC: 0.83 UIU/ML (ref 0.45–4.5)
UIBC SERPL-MCNC: 136 UG/DL (ref 155–355)

## 2024-06-03 PROCEDURE — 84443 ASSAY THYROID STIM HORMONE: CPT

## 2024-06-03 PROCEDURE — 83550 IRON BINDING TEST: CPT

## 2024-06-03 PROCEDURE — 82728 ASSAY OF FERRITIN: CPT

## 2024-06-03 PROCEDURE — 36415 COLL VENOUS BLD VENIPUNCTURE: CPT

## 2024-06-03 PROCEDURE — 83540 ASSAY OF IRON: CPT

## 2024-06-05 ENCOUNTER — TELEPHONE (OUTPATIENT)
Dept: FAMILY MEDICINE CLINIC | Facility: CLINIC | Age: 49
End: 2024-06-05

## 2024-06-05 DIAGNOSIS — E03.9 HYPOTHYROIDISM, UNSPECIFIED TYPE: ICD-10-CM

## 2024-06-05 DIAGNOSIS — E61.1 IRON DEFICIENCY: Primary | ICD-10-CM

## 2024-06-05 RX ORDER — FERROUS SULFATE 324(65)MG
324 TABLET, DELAYED RELEASE (ENTERIC COATED) ORAL
Qty: 60 TABLET | Refills: 5 | Status: SHIPPED | OUTPATIENT
Start: 2024-06-05 | End: 2024-07-05

## 2024-06-05 RX ORDER — LEVOTHYROXINE SODIUM 0.1 MG/1
100 TABLET ORAL DAILY
Qty: 90 TABLET | Refills: 1 | Status: SHIPPED | OUTPATIENT
Start: 2024-06-05

## 2024-06-05 NOTE — TELEPHONE ENCOUNTER
Patient stopped in. He received your [employer] message regarding his iron levels being stable. Does he still need to take the Ferrous Sulfate 325 and if so for how long? If he  does need to take it can you please send script to his pharmacy which is [facility] in [place]. Thank you!!

## 2024-06-05 NOTE — TELEPHONE ENCOUNTER
Notified patient prescription was sent to pharmacy. He would like to know how many months he has to take it ?

## 2024-08-06 ENCOUNTER — OFFICE VISIT (OUTPATIENT)
Dept: FAMILY MEDICINE CLINIC | Facility: CLINIC | Age: 49
End: 2024-08-06
Payer: COMMERCIAL

## 2024-08-06 VITALS
HEART RATE: 57 BPM | TEMPERATURE: 97.5 F | WEIGHT: 153 LBS | OXYGEN SATURATION: 97 % | HEIGHT: 66 IN | SYSTOLIC BLOOD PRESSURE: 118 MMHG | BODY MASS INDEX: 24.59 KG/M2 | DIASTOLIC BLOOD PRESSURE: 76 MMHG

## 2024-08-06 DIAGNOSIS — M77.00 MEDIAL EPICONDYLITIS OF ELBOW, UNSPECIFIED LATERALITY: Primary | ICD-10-CM

## 2024-08-06 PROCEDURE — 99213 OFFICE O/P EST LOW 20 MIN: CPT | Performed by: FAMILY MEDICINE

## 2024-08-06 NOTE — PROGRESS NOTES
Ambulatory Visit  Name: Bryn Conroy      : 1975      MRN: [id number]  Encounter Provider: Salinas Solis DO  Encounter Date: 2024   Encounter department: [facility]    Assessment & Plan   1. Medial epicondylitis of elbow, unspecified laterality  -     Diclofenac Sodium (VOLTAREN) 1 %; Apply 2 g topically 4 (four) times a day  Bilateral medial epicondylitis.  Will treat with topical NSAID.  Consider elastic sleeve support during physical activities and possibly even sleep.  Patient also reassured regarding small varicose vein left lower extremity.  No treatment necessary       History of Present Illness     Patient complains of bilateral elbow pain which has been present on and off for over 1 year.  Exacerbated when his arms are fully flexed while sleeping or with repetitive activity.    Also complains of lump on the anterior aspect of his left lower leg.  Patient exercises regularly including swimming and running      Review of Systems   Constitutional: Negative.    Respiratory: Negative.     Cardiovascular: Negative.    Gastrointestinal: Negative.    Genitourinary: Negative.    Musculoskeletal:  Positive for arthralgias.   Psychiatric/Behavioral: Negative.       History reviewed. No pertinent past medical history.  Past Surgical History:   Procedure Laterality Date   • WISDOM TOOTH EXTRACTION       Family History   Problem Relation Age of Onset   • Hypertension Mother    • No Known Problems Father         Cannot walk due to drinking alcohol   • Thyroid disease unspecified Sister    • No Known Problems Sister    • No Known Problems Brother    • No Known Problems Daughter    • No Known Problems Daughter    • No Known Problems Son      Social History     Tobacco Use   • Smoking status: Never     Passive exposure: Never   • Smokeless tobacco: Never   Vaping Use   • Vaping status: Never Used   Substance and Sexual Activity   • Alcohol use: Never   • Drug use: Never   • Sexual  "activity: Not on file     Current Outpatient Medications on File Prior to Visit   Medication Sig   • ferrous sulfate 324 (65 Fe) mg Take 1 tablet (324 mg total) by mouth 2 (two) times a day before meals   • levothyroxine 100 mcg tablet Take 1 tablet (100 mcg total) by mouth daily   • sildenafil (VIAGRA) 50 MG tablet Take 1 tab PO 1 hour prior to Sexual activity PRN.  Do not exceed 1 tab per day (Patient not taking: Reported on 8/6/2024)     No Known Allergies  Immunization History   Administered Date(s) Administered   • COVID-19 PFIZER VACCINE 0.3 ML IM 12/10/2021   • INFLUENZA 10/10/2016, 09/06/2019, 09/28/2020, 10/25/2021   • Tdap 03/13/2014, 03/27/2024     Objective     /76   Pulse 57   Temp 97.5 °F (36.4 °C)   Ht 5' 6.14\" (1.68 m)   Wt 69.4 kg (153 lb)   SpO2 97%   BMI 24.59 kg/m²     Physical Exam  Musculoskeletal:      Comments: Reproducible tenderness medial epicondyles of elbows bilaterally    Visible varicose vein left lower extremity         "

## 2024-08-08 ENCOUNTER — TELEPHONE (OUTPATIENT)
Age: 49
End: 2024-08-08

## 2024-08-08 NOTE — TELEPHONE ENCOUNTER
PA for Diclofenac Sodium (VOLTAREN) 1 %  SUBMITTED     via    []CMM-KEY   [x][employer]-Case ID # [id number]   []Faxed to plan   []Other website   []Phone call Case ID #     Office notes sent, clinical questions answered. Awaiting determination    Turnaround time for your insurance to make a decision on your Prior Authorization can take 7-21 business days.

## 2024-08-13 NOTE — TELEPHONE ENCOUNTER
PA for Diclofenac Sodium (VOLTAREN) 1 % Denied    Reason:(Screenshot if applicable)        Message sent to office clinical pool Yes    Denial letter scanned into Media Yes    Appeal started No ( Provider will need to decide if appeal is warranted and send clinical documentation to Prior Authorization Team for initiation.)    **Please follow up with your patient regarding denial and next steps**

## 2024-10-29 DIAGNOSIS — E03.9 HYPOTHYROIDISM, UNSPECIFIED TYPE: ICD-10-CM

## 2024-10-29 RX ORDER — LEVOTHYROXINE SODIUM 100 UG/1
100 TABLET ORAL DAILY
Qty: 30 TABLET | Refills: 2 | Status: SHIPPED | OUTPATIENT
Start: 2024-10-29

## 2024-11-13 ENCOUNTER — OFFICE VISIT (OUTPATIENT)
Dept: FAMILY MEDICINE CLINIC | Facility: CLINIC | Age: 49
End: 2024-11-13
Payer: COMMERCIAL

## 2024-11-13 VITALS
OXYGEN SATURATION: 99 % | HEIGHT: 67 IN | WEIGHT: 153.6 LBS | BODY MASS INDEX: 24.11 KG/M2 | DIASTOLIC BLOOD PRESSURE: 74 MMHG | HEART RATE: 53 BPM | TEMPERATURE: 98 F | SYSTOLIC BLOOD PRESSURE: 110 MMHG

## 2024-11-13 DIAGNOSIS — Z23 ENCOUNTER FOR IMMUNIZATION: ICD-10-CM

## 2024-11-13 DIAGNOSIS — R10.13 EPIGASTRIC PAIN: Primary | ICD-10-CM

## 2024-11-13 PROCEDURE — 90471 IMMUNIZATION ADMIN: CPT | Performed by: FAMILY MEDICINE

## 2024-11-13 PROCEDURE — 90656 IIV3 VACC NO PRSV 0.5 ML IM: CPT | Performed by: FAMILY MEDICINE

## 2024-11-13 PROCEDURE — 99214 OFFICE O/P EST MOD 30 MIN: CPT | Performed by: FAMILY MEDICINE

## 2024-11-13 NOTE — PROGRESS NOTES
Ambulatory Visit  Name: Bryn Conroy      : 1975      MRN: [id number]  Encounter Provider: Darryl Hernández DO  Encounter Date: 2024   Encounter department: [facility]    Assessment & Plan  Epigastric pain  Symptoms appear secondary to possible GERD/gastritis.  He was educated on the pathophysiology's problem.  Will start a bland diet.  Minimize triggers.  At this time, we will start treatment with omeprazole 20 mg daily for the next 3 to 4 weeks.  If symptoms are persisting, consider further evaluation.  Orders:    omeprazole (PriLOSEC) 20 mg delayed release capsule; Take 1 capsule (20 mg total) by mouth daily    Encounter for immunization    Orders:    influenza vaccine preservative-free 0.5 mL IM (Fluzone, Afluria, Fluarix, Flulaval)       History of Present Illness     HPI  Patient is a 49-year-old male presents to with a CC of epigastric abdominal discomfort as well as frequent stools.  He has noticed this when he was on his trip to [place].  He was hiking around [facility].  At that time, he states that he was developing frequent stools.  He also was having epigastric discomfort and black stools as well.  He did take an antidiarrheal while he was on his medication.  His symptoms did slowly subside however he still having epigastric discomfort since returning.  He has noticed this radiating to his throat.  He has not taken any for his current symptoms.    History obtained from : patient  Review of Systems   Constitutional:  Negative for activity change, chills, fatigue and fever.   HENT:  Negative for congestion, ear pain, sinus pressure and sore throat.    Eyes:  Negative for redness, itching and visual disturbance.   Respiratory:  Negative for cough and shortness of breath.    Cardiovascular:  Negative for chest pain and palpitations.   Gastrointestinal:  Negative for abdominal pain, diarrhea and nausea.   Endocrine: Negative for cold intolerance and heat intolerance.  "  Genitourinary:  Negative for dysuria, flank pain and frequency.   Musculoskeletal:  Negative for arthralgias, back pain, gait problem and myalgias.   Skin:  Negative for color change.   Allergic/Immunologic: Negative for environmental allergies.   Neurological:  Negative for dizziness, numbness and headaches.   Psychiatric/Behavioral:  Negative for behavioral problems and sleep disturbance.      Medical History Reviewed by provider this encounter:       Current Outpatient Medications on File Prior to Visit   Medication Sig Dispense Refill    Diclofenac Sodium (VOLTAREN) 1 % Apply 2 g topically 4 (four) times a day 100 g 1    levothyroxine 100 mcg tablet TAKE 1 TABLET BY MOUTH EVERY DAY 30 tablet 2    ferrous sulfate 324 (65 Fe) mg Take 1 tablet (324 mg total) by mouth 2 (two) times a day before meals (Patient not taking: Reported on 11/13/2024) 60 tablet 5    sildenafil (VIAGRA) 50 MG tablet Take 1 tab PO 1 hour prior to Sexual activity PRN.  Do not exceed 1 tab per day (Patient not taking: Reported on 8/6/2024) 10 tablet 0     No current facility-administered medications on file prior to visit.      Social History     Tobacco Use    Smoking status: Never     Passive exposure: Never    Smokeless tobacco: Never   Vaping Use    Vaping status: Never Used   Substance and Sexual Activity    Alcohol use: Never    Drug use: Never    Sexual activity: Not on file         Objective     /74 (BP Location: Left arm, Patient Position: Sitting, Cuff Size: Adult)   Pulse (!) 53   Temp 98 °F (36.7 °C) (Tympanic)   Ht 5' 6.5\" (1.689 m)   Wt 69.7 kg (153 lb 9.6 oz)   SpO2 99%   BMI 24.42 kg/m²     Physical Exam  Vitals reviewed.   Constitutional:       General: He is not in acute distress.     Appearance: Normal appearance. He is well-developed.   HENT:      Head: Normocephalic and atraumatic.      Right Ear: Tympanic membrane, ear canal and external ear normal. There is no impacted cerumen.      Left Ear: Tympanic " membrane, ear canal and external ear normal. There is no impacted cerumen.      Nose: Nose normal. No congestion or rhinorrhea.      Mouth/Throat:      Mouth: Mucous membranes are moist.      Pharynx: No oropharyngeal exudate or posterior oropharyngeal erythema.   Eyes:      General: No scleral icterus.        Right eye: No discharge.         Left eye: No discharge.      Extraocular Movements: Extraocular movements intact.      Conjunctiva/sclera: Conjunctivae normal.      Pupils: Pupils are equal, round, and reactive to light.   Neck:      Trachea: No tracheal deviation.   Cardiovascular:      Rate and Rhythm: Normal rate and regular rhythm.      Pulses: Normal pulses.           Dorsalis pedis pulses are 2+ on the right side and 2+ on the left side.        Posterior tibial pulses are 2+ on the right side and 2+ on the left side.      Heart sounds: Normal heart sounds. No murmur heard.     No friction rub. No gallop.   Pulmonary:      Effort: Pulmonary effort is normal. No respiratory distress.      Breath sounds: Normal breath sounds. No wheezing, rhonchi or rales.   Abdominal:      General: Bowel sounds are normal. There is no distension.      Palpations: Abdomen is soft.      Tenderness: There is no abdominal tenderness. There is no guarding or rebound.   Musculoskeletal:         General: Normal range of motion.      Cervical back: Normal range of motion and neck supple.      Right lower leg: No edema.      Left lower leg: No edema.   Lymphadenopathy:      Head:      Right side of head: No submental or submandibular adenopathy.      Left side of head: No submental or submandibular adenopathy.      Cervical: No cervical adenopathy.      Right cervical: No superficial, deep or posterior cervical adenopathy.     Left cervical: No superficial, deep or posterior cervical adenopathy.   Skin:     General: Skin is warm and dry.      Findings: No erythema.   Neurological:      General: No focal deficit present.       Mental Status: He is alert and oriented to person, place, and time.      Cranial Nerves: No cranial nerve deficit.      Sensory: Sensation is intact. No sensory deficit.      Motor: Motor function is intact.   Psychiatric:         Attention and Perception: Attention and perception normal.         Mood and Affect: Mood is not anxious or depressed.         Speech: Speech normal.         Behavior: Behavior normal.         Thought Content: Thought content normal.         Judgment: Judgment normal.

## 2024-11-30 DIAGNOSIS — E03.9 HYPOTHYROIDISM, UNSPECIFIED TYPE: ICD-10-CM

## 2024-12-02 RX ORDER — LEVOTHYROXINE SODIUM 100 UG/1
100 TABLET ORAL DAILY
Qty: 90 TABLET | Refills: 0 | Status: SHIPPED | OUTPATIENT
Start: 2024-12-02

## 2024-12-05 DIAGNOSIS — R10.13 EPIGASTRIC PAIN: ICD-10-CM

## 2024-12-16 ENCOUNTER — TELEPHONE (OUTPATIENT)
Dept: GASTROENTEROLOGY | Facility: CLINIC | Age: 49
End: 2024-12-16

## 2024-12-20 ENCOUNTER — OFFICE VISIT (OUTPATIENT)
Dept: GASTROENTEROLOGY | Facility: CLINIC | Age: 49
End: 2024-12-20
Payer: COMMERCIAL

## 2024-12-20 VITALS
HEIGHT: 66 IN | DIASTOLIC BLOOD PRESSURE: 70 MMHG | WEIGHT: 153 LBS | TEMPERATURE: 97.2 F | SYSTOLIC BLOOD PRESSURE: 120 MMHG | BODY MASS INDEX: 24.59 KG/M2

## 2024-12-20 DIAGNOSIS — Z12.11 COLON CANCER SCREENING: ICD-10-CM

## 2024-12-20 DIAGNOSIS — K59.04 CHRONIC IDIOPATHIC CONSTIPATION: Primary | ICD-10-CM

## 2024-12-20 DIAGNOSIS — R10.13 DYSPEPSIA: ICD-10-CM

## 2024-12-20 PROCEDURE — 99204 OFFICE O/P NEW MOD 45 MIN: CPT | Performed by: INTERNAL MEDICINE

## 2024-12-20 RX ORDER — SODIUM CHLORIDE, SODIUM LACTATE, POTASSIUM CHLORIDE, CALCIUM CHLORIDE 600; 310; 30; 20 MG/100ML; MG/100ML; MG/100ML; MG/100ML
125 INJECTION, SOLUTION INTRAVENOUS CONTINUOUS
OUTPATIENT
Start: 2024-12-20

## 2024-12-20 NOTE — PATIENT INSTRUCTIONS
Scheduled date of EGD/colonoscopy (as of today): 12/31/24  Physician performing EGD/colonoscopy: Dr. Garcia  Location of EGD[facility]colonoscopy: [facility]  Desired bowel prep reviewed with patient: Golytely  Instructions reviewed with patient by: OSABLDO (in office)  Clearances:  N/A

## 2024-12-20 NOTE — PROGRESS NOTES
"Name: Bryn Conroy      : 1975      MRN: [id number]  Encounter Provider: Rory Garcia MD  Encounter Date: 2024   Encounter department: [facility] [place]  :  Assessment & Plan  Chronic idiopathic constipation  Chronic idiopathic constipation since he was in high school  Reports recent worsening of constipation -no change in his diet   Continue MiraLAX and fiber--  She has no significant improvement, I recommend Linzess 145 mcg once daily  Orders:    linaCLOtide 145 MCG CAPS; Take 1 capsule (145 mcg total) by mouth daily    Colon cancer screening  Given his age, he is due for screening colonoscopy  Colonoscopy ordered  Orders:    Ambulatory Referral to Gastroenterology    polyethylene glycol (GOLYTELY) 4000 mL solution; Take 4,000 mL by mouth once for 1 dose    Colonoscopy; Future    Dyspepsia  Intermittent epigastric pain likely secondary to dyspepsia rule out peptic ulcer disease  We will do EGD with biopsy to assess for H. pylori  Orders:    EGD; Future        History of Present Illness   HPI  Bryn Conroy is a 49 y.o. male who presents for evaluation of constipation.  He has longstanding constipation since high school.  Recently he noted worsening of her constipation associated with upper abdominal pain    Report recent travel to [place].  He noted constipation for 7 to 10 days followed by bouts of diarrhea  Denies family history of colon cancer  No melena or hematochezia              Review of Systems       Objective   /70 (BP Location: Right arm, Patient Position: Sitting, Cuff Size: Adult)   Temp (!) 97.2 °F (36.2 °C) (Tympanic)   Ht 5' 6\" (1.676 m)   Wt 69.4 kg (153 lb)   BMI 24.69 kg/m²      Physical Exam      "

## 2024-12-20 NOTE — ASSESSMENT & PLAN NOTE
Chronic idiopathic constipation since he was in high school  Reports recent worsening of constipation -no change in his diet   Continue MiraLAX and fiber--  She has no significant improvement, I recommend Linzess 145 mcg once daily  Orders:    linaCLOtide 145 MCG CAPS; Take 1 capsule (145 mcg total) by mouth daily

## 2024-12-23 NOTE — TELEPHONE ENCOUNTER
Pt calling to r/s colonoscopy/EGD  from Dec 31st to Feb 10th, Dr. Garcia, [facility], pt confirmed still has prep instructions.

## 2025-01-20 ENCOUNTER — PATIENT MESSAGE (OUTPATIENT)
Dept: GASTROENTEROLOGY | Facility: CLINIC | Age: 50
End: 2025-01-20

## 2025-01-27 ENCOUNTER — ANESTHESIA (OUTPATIENT)
Dept: ANESTHESIOLOGY | Facility: HOSPITAL | Age: 50
End: 2025-01-27

## 2025-01-27 ENCOUNTER — ANESTHESIA EVENT (OUTPATIENT)
Dept: ANESTHESIOLOGY | Facility: HOSPITAL | Age: 50
End: 2025-01-27

## 2025-02-10 ENCOUNTER — HOSPITAL ENCOUNTER (OUTPATIENT)
Dept: GASTROENTEROLOGY | Facility: AMBULARY SURGERY CENTER | Age: 50
Setting detail: OUTPATIENT SURGERY
Discharge: HOME | End: 2025-02-10
Attending: INTERNAL MEDICINE
Payer: COMMERCIAL

## 2025-02-10 ENCOUNTER — ANESTHESIA (OUTPATIENT)
Dept: GASTROENTEROLOGY | Facility: AMBULARY SURGERY CENTER | Age: 50
End: 2025-02-10
Payer: COMMERCIAL

## 2025-02-10 VITALS
SYSTOLIC BLOOD PRESSURE: 101 MMHG | HEART RATE: 52 BPM | TEMPERATURE: 97.5 F | OXYGEN SATURATION: 98 % | RESPIRATION RATE: 18 BRPM | DIASTOLIC BLOOD PRESSURE: 68 MMHG

## 2025-02-10 DIAGNOSIS — Z12.11 COLON CANCER SCREENING: ICD-10-CM

## 2025-02-10 DIAGNOSIS — R10.13 DYSPEPSIA: ICD-10-CM

## 2025-02-10 PROCEDURE — 88305 TISSUE EXAM BY PATHOLOGIST: CPT | Performed by: STUDENT IN AN ORGANIZED HEALTH CARE EDUCATION/TRAINING PROGRAM

## 2025-02-10 PROCEDURE — 45385 COLONOSCOPY W/LESION REMOVAL: CPT | Performed by: INTERNAL MEDICINE

## 2025-02-10 PROCEDURE — 43239 EGD BIOPSY SINGLE/MULTIPLE: CPT | Performed by: INTERNAL MEDICINE

## 2025-02-10 RX ORDER — SODIUM CHLORIDE, SODIUM LACTATE, POTASSIUM CHLORIDE, CALCIUM CHLORIDE 600; 310; 30; 20 MG/100ML; MG/100ML; MG/100ML; MG/100ML
INJECTION, SOLUTION INTRAVENOUS CONTINUOUS PRN
Status: DISCONTINUED | OUTPATIENT
Start: 2025-02-10 | End: 2025-02-10

## 2025-02-10 RX ORDER — POLYETHYLENE GLYCOL 3350 17 G/17G
17 POWDER, FOR SOLUTION ORAL DAILY
COMMUNITY

## 2025-02-10 RX ORDER — LIDOCAINE HYDROCHLORIDE 20 MG/ML
INJECTION, SOLUTION EPIDURAL; INFILTRATION; INTRACAUDAL; PERINEURAL AS NEEDED
Status: DISCONTINUED | OUTPATIENT
Start: 2025-02-10 | End: 2025-02-10

## 2025-02-10 RX ORDER — GLYCOPYRROLATE 0.2 MG/ML
INJECTION INTRAMUSCULAR; INTRAVENOUS AS NEEDED
Status: DISCONTINUED | OUTPATIENT
Start: 2025-02-10 | End: 2025-02-10

## 2025-02-10 RX ORDER — PROPOFOL 10 MG/ML
INJECTION, EMULSION INTRAVENOUS AS NEEDED
Status: DISCONTINUED | OUTPATIENT
Start: 2025-02-10 | End: 2025-02-10

## 2025-02-10 RX ADMIN — GLYCOPYRROLATE 0.1 MG: 0.2 INJECTION INTRAMUSCULAR; INTRAVENOUS at 08:27

## 2025-02-10 RX ADMIN — Medication 40 MG: at 08:40

## 2025-02-10 RX ADMIN — PROPOFOL 50 MG: 10 INJECTION, EMULSION INTRAVENOUS at 08:32

## 2025-02-10 RX ADMIN — PROPOFOL 50 MG: 10 INJECTION, EMULSION INTRAVENOUS at 08:31

## 2025-02-10 RX ADMIN — SODIUM CHLORIDE, SODIUM LACTATE, POTASSIUM CHLORIDE, AND CALCIUM CHLORIDE: .6; .31; .03; .02 INJECTION, SOLUTION INTRAVENOUS at 08:19

## 2025-02-10 RX ADMIN — PROPOFOL 140 MG: 10 INJECTION, EMULSION INTRAVENOUS at 08:27

## 2025-02-10 RX ADMIN — LIDOCAINE HYDROCHLORIDE 80 MG: 20 INJECTION, SOLUTION EPIDURAL; INFILTRATION; INTRACAUDAL at 08:27

## 2025-02-10 RX ADMIN — PROPOFOL 140 MCG/KG/MIN: 10 INJECTION, EMULSION INTRAVENOUS at 08:36

## 2025-02-10 RX ADMIN — PROPOFOL 60 MG: 10 INJECTION, EMULSION INTRAVENOUS at 08:29

## 2025-02-10 NOTE — H&P
History and Physical - [facility]  Bryn Conroy 50 y.o. male MRN: [id number]    HPI: Bryn Conroy is a 50 y.o. year old male who presents for egd and colonoscopy foe colon cancer screening and dyspepsia      Review of Systems    Historical Information   Past Medical History:   Diagnosis Date    Constipation     Disease of thyroid gland      Past Surgical History:   Procedure Laterality Date    WISDOM TOOTH EXTRACTION       Social History   Social History     Substance and Sexual Activity   Alcohol Use Never     Social History     Substance and Sexual Activity   Drug Use Never     Social History     Tobacco Use   Smoking Status Never    Passive exposure: Never   Smokeless Tobacco Never     Family History   Problem Relation Age of Onset    Hypertension Mother     No Known Problems Father         Cannot walk due to drinking alcohol    Thyroid disease unspecified Sister     No Known Problems Sister     No Known Problems Brother     No Known Problems Daughter     No Known Problems Daughter     No Known Problems Son        Meds/Allergies     Not in a hospital admission.    No Known Allergies    Objective     /76   Pulse 79   Temp (!) 96.8 °F (36 °C) (Temporal)   Resp 16   SpO2 100%       PHYSICAL EXAM    Gen: NAD  CV: RRR  CHEST: Clear  ABD: soft, NT/ND  EXT: no edema  Neuro: AAO      ASSESSMENT/PLAN:  This is a 50 y.o. year old male here for screening and evaluation of dyspepsia     PLAN:   Procedure: EGD and colonoscopy

## 2025-02-10 NOTE — ANESTHESIA POSTPROCEDURE EVALUATION
Post-Op Assessment Note    CV Status:  Stable  Pain Score: 0    Pain management: adequate       Mental Status:  Sleepy   Hydration Status:  Stable   PONV Controlled:  None   Airway Patency:  Patent  Two or more mitigation strategies used for obstructive sleep apnea   Post Op Vitals Reviewed: Yes    No anethesia notable event occurred.    Staff: CRNA           Last Filed PACU Vitals:  Vitals Value Taken Time   Temp 97.6 °F (36.4 °C) 02/10/25 0900   Pulse 59 02/10/25 0900   BP 89/50 02/10/25 0900   Resp 18 02/10/25 0900   SpO2 2 % 02/10/25 0900

## 2025-02-10 NOTE — ANESTHESIA PREPROCEDURE EVALUATION
Procedure:  EGD  COLONOSCOPY    Relevant Problems   ENDO   (+) Hypothyroidism due to Hashimoto's thyroiditis        Physical Exam    Airway    Mallampati score: II  TM Distance: >3 FB  Neck ROM: full     Dental   No notable dental hx     Cardiovascular      Pulmonary      Other Findings        Anesthesia Plan  ASA Score- 2     Anesthesia Type- IV sedation with anesthesia with ASA Monitors.         Additional Monitors:     Airway Plan:            Plan Factors-Exercise tolerance (METS): >4 METS.    Chart reviewed.    Patient summary reviewed.    Patient is not a current smoker.      Obstructive sleep apnea risk education given perioperatively.        Induction- intravenous.    Postoperative Plan-     Perioperative Resuscitation Plan - Level 1 - Full Code.       Informed Consent- Anesthetic plan and risks discussed with patient.  I personally reviewed this patient with the CRNA. Discussed and agreed on the Anesthesia Plan with the CRNA..      NPO Status:  Vitals Value Taken Time   Date of last liquid 02/09/25 02/10/25 0740   Time of last liquid 2300 02/10/25 0740   Date of last solid 02/08/25 02/10/25 0740   Time of last solid 1800 02/10/25 0740

## 2025-02-12 ENCOUNTER — RESULTS FOLLOW-UP (OUTPATIENT)
Dept: GASTROENTEROLOGY | Facility: CLINIC | Age: 50
End: 2025-02-12

## 2025-02-12 PROCEDURE — 88305 TISSUE EXAM BY PATHOLOGIST: CPT | Performed by: STUDENT IN AN ORGANIZED HEALTH CARE EDUCATION/TRAINING PROGRAM

## 2025-03-06 ENCOUNTER — OFFICE VISIT (OUTPATIENT)
Dept: ENDOCRINOLOGY | Facility: CLINIC | Age: 50
End: 2025-03-06
Payer: COMMERCIAL

## 2025-03-06 ENCOUNTER — APPOINTMENT (OUTPATIENT)
Dept: LAB | Facility: CLINIC | Age: 50
End: 2025-03-06
Payer: COMMERCIAL

## 2025-03-06 VITALS
HEIGHT: 67 IN | WEIGHT: 166 LBS | BODY MASS INDEX: 26.06 KG/M2 | HEART RATE: 72 BPM | SYSTOLIC BLOOD PRESSURE: 128 MMHG | DIASTOLIC BLOOD PRESSURE: 88 MMHG

## 2025-03-06 DIAGNOSIS — E03.9 HYPOTHYROIDISM, UNSPECIFIED TYPE: ICD-10-CM

## 2025-03-06 DIAGNOSIS — E06.3 HYPOTHYROIDISM DUE TO HASHIMOTO'S THYROIDITIS: ICD-10-CM

## 2025-03-06 DIAGNOSIS — E06.3 HYPOTHYROIDISM DUE TO HASHIMOTO'S THYROIDITIS: Primary | ICD-10-CM

## 2025-03-06 LAB — TSH SERPL DL<=0.05 MIU/L-ACNC: 1.93 UIU/ML (ref 0.45–4.5)

## 2025-03-06 PROCEDURE — 84443 ASSAY THYROID STIM HORMONE: CPT

## 2025-03-06 PROCEDURE — 36415 COLL VENOUS BLD VENIPUNCTURE: CPT

## 2025-03-06 PROCEDURE — 99214 OFFICE O/P EST MOD 30 MIN: CPT | Performed by: PHYSICIAN ASSISTANT

## 2025-03-06 RX ORDER — LEVOTHYROXINE SODIUM 100 UG/1
100 TABLET ORAL DAILY
Qty: 90 TABLET | Refills: 2 | Status: SHIPPED | OUTPATIENT
Start: 2025-03-06

## 2025-03-06 NOTE — PATIENT INSTRUCTIONS
Ensure you are taking your thyroid medication at least one hour prior to meals, on an empty stomach and 4 hours before any vitamins/supplements  Obtain labs prior to follow-up appointment  Hold all biotin-containing supplements for three days prior to thyroid lab draws

## 2025-03-06 NOTE — ASSESSMENT & PLAN NOTE
TSH: 0.831  Maintained on levothyroxine 100mcg daily, continue  Obtain updated thyroid labs  Orders:    TSH, 3rd generation with Free T4 reflex; Future    TSH, 3rd generation with Free T4 reflex; Future    TSH, 3rd generation with Free T4 reflex; Future

## 2025-03-06 NOTE — PROGRESS NOTES
"Name: Bryn Conroy      : 1975      MRN: [id number]  Encounter Provider: Shruthi Tavera PA-C  Encounter Date: 3/6/2025   Encounter department: [facility] [place]    Chief Complaint   Patient presents with    Hypothyroidism   :  Assessment & Plan  Hypothyroidism due to Hashimoto's thyroiditis  TSH: 0.831  Maintained on levothyroxine 100mcg daily, continue  Obtain updated thyroid labs  Orders:    TSH, 3rd generation with Free T4 reflex; Future    TSH, 3rd generation with Free T4 reflex; Future    TSH, 3rd generation with Free T4 reflex; Future    Hypothyroidism, unspecified type    Orders:    levothyroxine 100 mcg tablet; Take 1 tablet (100 mcg total) by mouth daily        History of Present Illness     Bryn Conroy is a 50 y.o. male medical history of hypothyroidism due to Hashimoto's thyroiditis.  He is maintained on levothyroxine 100 mcg daily.    Patient has been taking levothyroxine first thing in the morning with water, separate from food and other medications by 30 minutes, and from supplements by at least 4 hours. Denies weight fluctuations, mood instability, changes in hair growth patterns, difficulty swallowing, breathing or changes in voice.             Review of Systems as per HPI       Medical History Reviewed by provider this encounter:     .    Objective   /88 (BP Location: Left arm, Patient Position: Sitting, Cuff Size: Adult)   Pulse 72   Ht 5' 7\" (1.702 m)   Wt 75.3 kg (166 lb)   BMI 26.00 kg/m²      Body mass index is 26 kg/m².  Wt Readings from Last 3 Encounters:   25 75.3 kg (166 lb)   24 69.4 kg (153 lb)   24 69.7 kg (153 lb 9.6 oz)     Physical Exam  Vitals and nursing note reviewed.   Constitutional:       General: He is not in acute distress.     Appearance: He is well-developed.   HENT:      Head: Normocephalic and atraumatic.   Eyes:      General: No scleral icterus.     Conjunctiva/sclera: Conjunctivae normal. "   Neck:      Thyroid: No thyroid mass, thyromegaly or thyroid tenderness.   Pulmonary:      Effort: Pulmonary effort is normal.   Abdominal:      Palpations: Abdomen is soft.   Neurological:      Mental Status: He is alert.   Psychiatric:         Attention and Perception: Attention normal.         Labs:   Lab Results   Component Value Date    HGBA1C 5.8 (H) 03/27/2024    HGBA1C 5.6 06/12/2023     Lab Results   Component Value Date    CREATININE 0.83 03/27/2024    CREATININE 0.94 06/12/2023    CREATININE 0.80 08/24/2021    BUN 13 03/27/2024    K 4.5 03/27/2024     03/27/2024    CO2 30 03/27/2024     eGFR   Date Value Ref Range Status   03/27/2024 104 ml/min/1.73sq m Final     Lab Results   Component Value Date    HDL 55 03/27/2024    TRIG 81 03/27/2024     Lab Results   Component Value Date    ALT 17 03/27/2024    AST 16 03/27/2024    ALKPHOS 45 03/27/2024     Lab Results   Component Value Date    YCP9YXQTGQLK 0.831 06/03/2024    KHP2LJETMCDF 1.786 01/05/2024    YKO6SWRRUYDK 0.657 09/01/2023     Lab Results   Component Value Date    FREET4 1.00 01/05/2024       Patient Instructions   Ensure you are taking your thyroid medication at least one hour prior to meals, on an empty stomach and 4 hours before any vitamins/supplements  Obtain labs prior to follow-up appointment  Hold all biotin-containing supplements for three days prior to thyroid lab draws      Discussed with the patient and all questioned fully answered. He will call me if any problems arise.

## 2025-04-10 ENCOUNTER — RESULTS FOLLOW-UP (OUTPATIENT)
Dept: FAMILY MEDICINE CLINIC | Facility: CLINIC | Age: 50
End: 2025-04-10

## 2025-04-10 ENCOUNTER — OFFICE VISIT (OUTPATIENT)
Dept: FAMILY MEDICINE CLINIC | Facility: CLINIC | Age: 50
End: 2025-04-10
Payer: COMMERCIAL

## 2025-04-10 ENCOUNTER — APPOINTMENT (OUTPATIENT)
Dept: LAB | Facility: CLINIC | Age: 50
End: 2025-04-10
Payer: COMMERCIAL

## 2025-04-10 VITALS
TEMPERATURE: 97.7 F | HEIGHT: 67 IN | WEIGHT: 166.2 LBS | BODY MASS INDEX: 26.09 KG/M2 | DIASTOLIC BLOOD PRESSURE: 82 MMHG | SYSTOLIC BLOOD PRESSURE: 118 MMHG | HEART RATE: 76 BPM | OXYGEN SATURATION: 96 %

## 2025-04-10 DIAGNOSIS — M79.10 MYALGIA: Primary | ICD-10-CM

## 2025-04-10 DIAGNOSIS — M79.10 MYALGIA: ICD-10-CM

## 2025-04-10 DIAGNOSIS — E61.1 IRON DEFICIENCY: ICD-10-CM

## 2025-04-10 LAB
ALBUMIN SERPL BCG-MCNC: 4.6 G/DL (ref 3.5–5)
ALP SERPL-CCNC: 56 U/L (ref 34–104)
ALT SERPL W P-5'-P-CCNC: 19 U/L (ref 7–52)
ANION GAP SERPL CALCULATED.3IONS-SCNC: 9 MMOL/L (ref 4–13)
AST SERPL W P-5'-P-CCNC: 21 U/L (ref 13–39)
BILIRUB SERPL-MCNC: 0.63 MG/DL (ref 0.2–1)
BUN SERPL-MCNC: 13 MG/DL (ref 5–25)
CALCIUM SERPL-MCNC: 9.3 MG/DL (ref 8.4–10.2)
CHLORIDE SERPL-SCNC: 104 MMOL/L (ref 96–108)
CK SERPL-CCNC: 135 U/L (ref 39–308)
CO2 SERPL-SCNC: 27 MMOL/L (ref 21–32)
CREAT SERPL-MCNC: 0.96 MG/DL (ref 0.6–1.3)
D DIMER PPP FEU-MCNC: <0.27 UG/ML FEU
ERYTHROCYTE [DISTWIDTH] IN BLOOD BY AUTOMATED COUNT: 14.2 % (ref 11.6–15.1)
FERRITIN SERPL-MCNC: 12 NG/ML (ref 30–336)
GFR SERPL CREATININE-BSD FRML MDRD: 93 ML/MIN/1.73SQ M
GLUCOSE P FAST SERPL-MCNC: 76 MG/DL (ref 65–99)
HCT VFR BLD AUTO: 43 % (ref 36.5–49.3)
HGB BLD-MCNC: 13.4 G/DL (ref 12–17)
IRON SATN MFR SERPL: 36 % (ref 15–50)
IRON SERPL-MCNC: 159 UG/DL (ref 50–212)
MCH RBC QN AUTO: 26.8 PG (ref 26.8–34.3)
MCHC RBC AUTO-ENTMCNC: 31.2 G/DL (ref 31.4–37.4)
MCV RBC AUTO: 86 FL (ref 82–98)
PLATELET # BLD AUTO: 193 THOUSANDS/UL (ref 149–390)
PMV BLD AUTO: 11.8 FL (ref 8.9–12.7)
POTASSIUM SERPL-SCNC: 4.6 MMOL/L (ref 3.5–5.3)
PROT SERPL-MCNC: 6.9 G/DL (ref 6.4–8.4)
RBC # BLD AUTO: 5 MILLION/UL (ref 3.88–5.62)
SODIUM SERPL-SCNC: 140 MMOL/L (ref 135–147)
TIBC SERPL-MCNC: 438.2 UG/DL (ref 250–450)
TRANSFERRIN SERPL-MCNC: 313 MG/DL (ref 203–362)
UIBC SERPL-MCNC: 279 UG/DL (ref 155–355)
WBC # BLD AUTO: 5.6 THOUSAND/UL (ref 4.31–10.16)

## 2025-04-10 PROCEDURE — 82550 ASSAY OF CK (CPK): CPT

## 2025-04-10 PROCEDURE — 80053 COMPREHEN METABOLIC PANEL: CPT

## 2025-04-10 PROCEDURE — 83540 ASSAY OF IRON: CPT

## 2025-04-10 PROCEDURE — 85027 COMPLETE CBC AUTOMATED: CPT

## 2025-04-10 PROCEDURE — 85379 FIBRIN DEGRADATION QUANT: CPT

## 2025-04-10 PROCEDURE — 36415 COLL VENOUS BLD VENIPUNCTURE: CPT

## 2025-04-10 PROCEDURE — 83550 IRON BINDING TEST: CPT

## 2025-04-10 PROCEDURE — 99214 OFFICE O/P EST MOD 30 MIN: CPT | Performed by: FAMILY MEDICINE

## 2025-04-10 PROCEDURE — 82728 ASSAY OF FERRITIN: CPT

## 2025-04-10 NOTE — PROGRESS NOTES
Name: Bryn Conroy      : 1975      MRN: [id number]  Encounter Provider: Darryl Hernández DO  Encounter Date: 4/10/2025   Encounter department: [facility]  :  Assessment & Plan  Myalgia  Patient symptoms any previous symptoms and is unclear to exact cause of his myalgias.  He appears to be developing cramps when he is in the car for greater than 1 hour duration.  Reviewed the differential possible causes with him.  At this time, his exam appeared stable.  There is no signs of lumbar radiculopathy.  Will check blood work to further assess for any systemic abnormalities.  Orders:    CK; Future    D-dimer, quantitative; Future    Comprehensive metabolic panel; Future    CBC and Platelet; Future    Iron Panel (Includes Ferritin, Iron Sat%, Iron, and TIBC); Future    Iron deficiency    Orders:    CK; Future    D-dimer, quantitative; Future    Comprehensive metabolic panel; Future    CBC and Platelet; Future    Iron Panel (Includes Ferritin, Iron Sat%, Iron, and TIBC); Future           History of Present Illness   Leg Pain   Incident onset: 6 weeks. The incident occurred at home. There was no injury mechanism. The pain is present in the right thigh, right knee and right ankle. The pain is at a severity of 7/10. The pain is moderate. The pain has been Intermittent since onset. Pertinent negatives include no numbness or tingling. He reports no foreign bodies present. Exacerbated by: sitting in car. He has tried NSAIDs for the symptoms. The treatment provided no relief.   Back Pain  Associated symptoms include leg pain. Pertinent negatives include no abdominal pain, chest pain, dysuria, fever, headaches, numbness or tingling.     Review of Systems   Constitutional:  Negative for activity change, chills, fatigue and fever.   HENT:  Negative for congestion, ear pain, sinus pressure and sore throat.    Eyes:  Negative for redness, itching and visual disturbance.   Respiratory:  Negative for cough  "and shortness of breath.    Cardiovascular:  Negative for chest pain and palpitations.   Gastrointestinal:  Negative for abdominal pain, diarrhea and nausea.   Endocrine: Negative for cold intolerance and heat intolerance.   Genitourinary:  Negative for dysuria, flank pain and frequency.   Musculoskeletal:  Positive for back pain. Negative for arthralgias, gait problem and myalgias.   Skin:  Negative for color change.   Allergic/Immunologic: Negative for environmental allergies.   Neurological:  Negative for dizziness, tingling, numbness and headaches.   Psychiatric/Behavioral:  Negative for behavioral problems and sleep disturbance.        Objective   /82 (BP Location: Left arm, Patient Position: Sitting, Cuff Size: Adult)   Pulse 76   Temp 97.7 °F (36.5 °C) (Temporal)   Ht 5' 7\" (1.702 m)   Wt 75.4 kg (166 lb 3.2 oz)   SpO2 96%   BMI 26.03 kg/m²      Physical Exam  Vitals reviewed.   Constitutional:       General: He is not in acute distress.     Appearance: Normal appearance. He is well-developed.   HENT:      Head: Normocephalic and atraumatic.      Right Ear: Tympanic membrane, ear canal and external ear normal. There is no impacted cerumen.      Left Ear: Tympanic membrane, ear canal and external ear normal. There is no impacted cerumen.      Nose: Nose normal. No congestion or rhinorrhea.      Mouth/Throat:      Mouth: Mucous membranes are moist.      Pharynx: No oropharyngeal exudate or posterior oropharyngeal erythema.   Eyes:      General: No scleral icterus.        Right eye: No discharge.         Left eye: No discharge.      Extraocular Movements: Extraocular movements intact.      Conjunctiva/sclera: Conjunctivae normal.      Pupils: Pupils are equal, round, and reactive to light.   Neck:      Trachea: No tracheal deviation.   Cardiovascular:      Rate and Rhythm: Normal rate and regular rhythm.      Pulses: Normal pulses.           Dorsalis pedis pulses are 2+ on the right side and 2+ on " the left side.        Posterior tibial pulses are 2+ on the right side and 2+ on the left side.      Heart sounds: Normal heart sounds. No murmur heard.     No friction rub. No gallop.   Pulmonary:      Effort: Pulmonary effort is normal. No respiratory distress.      Breath sounds: Normal breath sounds. No wheezing, rhonchi or rales.   Abdominal:      General: Bowel sounds are normal. There is no distension.      Palpations: Abdomen is soft.      Tenderness: There is no abdominal tenderness. There is no guarding or rebound.   Musculoskeletal:         General: Normal range of motion.      Cervical back: Normal range of motion and neck supple.      Right lower leg: No edema.      Left lower leg: No edema.   Lymphadenopathy:      Head:      Right side of head: No submental or submandibular adenopathy.      Left side of head: No submental or submandibular adenopathy.      Cervical: No cervical adenopathy.      Right cervical: No superficial, deep or posterior cervical adenopathy.     Left cervical: No superficial, deep or posterior cervical adenopathy.   Skin:     General: Skin is warm and dry.      Findings: No erythema.   Neurological:      General: No focal deficit present.      Mental Status: He is alert and oriented to person, place, and time.      Cranial Nerves: No cranial nerve deficit.      Sensory: Sensation is intact. No sensory deficit.      Motor: Motor function is intact.   Psychiatric:         Attention and Perception: Attention and perception normal.         Mood and Affect: Mood is not anxious or depressed.         Speech: Speech normal.         Behavior: Behavior normal.         Thought Content: Thought content normal.         Judgment: Judgment normal.

## 2025-04-16 DIAGNOSIS — E61.1 IRON DEFICIENCY: Primary | ICD-10-CM

## 2025-04-16 RX ORDER — IRON BIS-GLYCINAT/VIT C/FA/B12 28-60-0.4
1 CAPSULE ORAL DAILY
Qty: 90 CAPSULE | Refills: 0 | Status: SHIPPED | OUTPATIENT
Start: 2025-04-16

## 2025-05-01 ENCOUNTER — TELEPHONE (OUTPATIENT)
Dept: FAMILY MEDICINE CLINIC | Facility: CLINIC | Age: 50
End: 2025-05-01

## 2025-05-14 ENCOUNTER — APPOINTMENT (OUTPATIENT)
Dept: RADIOLOGY | Facility: MEDICAL CENTER | Age: 50
End: 2025-05-14
Attending: ORTHOPAEDIC SURGERY
Payer: COMMERCIAL

## 2025-05-14 ENCOUNTER — OFFICE VISIT (OUTPATIENT)
Dept: OBGYN CLINIC | Facility: MEDICAL CENTER | Age: 50
End: 2025-05-14
Payer: COMMERCIAL

## 2025-05-14 VITALS — BODY MASS INDEX: 26.12 KG/M2 | HEIGHT: 67 IN | WEIGHT: 166.4 LBS

## 2025-05-14 DIAGNOSIS — M54.50 LUMBAR PAIN: ICD-10-CM

## 2025-05-14 DIAGNOSIS — M54.16 RADICULOPATHY, LUMBAR REGION: Primary | ICD-10-CM

## 2025-05-14 PROCEDURE — 72110 X-RAY EXAM L-2 SPINE 4/>VWS: CPT

## 2025-05-14 PROCEDURE — 99204 OFFICE O/P NEW MOD 45 MIN: CPT | Performed by: ORTHOPAEDIC SURGERY

## 2025-05-14 RX ORDER — DICLOFENAC SODIUM 75 MG/1
75 TABLET, DELAYED RELEASE ORAL 2 TIMES DAILY PRN
Qty: 60 TABLET | Refills: 1 | Status: SHIPPED | OUTPATIENT
Start: 2025-05-14

## 2025-05-14 NOTE — PROGRESS NOTES
Name: Bryn Conroy      : 1975      MRN: [id number]  Encounter Provider: Jami Sanchez DO  Encounter Date: 2025   Encounter [facility]  :  Assessment & Plan  Radiculopathy, lumbar region    Patient has lumbar radiculopathy   Treatment options were reviewed with patient today.  Medications: Can take Tylenol 1,000mg by mouth every 8 hours as needed for pain.  Do not exceed 3,000mg per day.    Diclofenac prn pain, medications warnings were reviewed with patient.  Patient NOT to take with other NSAIDs or oral steroids.    Ice, heat, topical gels as needed.  PT: PT script provided. Patient may go for education on a home exercise program.   Activity: Continue activity as tolerated  Patient may follow-up with spine and pain management in 8 weeks visit or sooner improved physical therapy.  Orders:    XR spine lumbar minimum 4 views non injury; Future    Ambulatory referral to Spine & Pain Management; Future    Ambulatory Referral to Physical Therapy; Future        Return if symptoms worsen or fail to improve, for Spine and pain management evaluation.    I answered all of the patient's questions during the visit and provided education of the patient's condition during the visit.  The patient verbalized understanding of the information given and agrees with the plan.  This note was dictated using trueAnthem software.  It may contain errors including improperly dictated words.  Please contact physician directly for any questions.      History of Present Illness   HPI  Chief complaint:   Chief Complaint   Patient presents with    Lower Back - Pain        HPI: Bryn Conroy is a 50 y.o. male that c/o of low back pain and right leg pain.  Patient states his pain is gone for a couple of months denies any mechanism of injuries or traumas.  Patient states he is a huge runner and states that he has discontinued running for a little bit due to pain.  Patient  "states his pain is along the posterior aspect that radiates down his right leg.  Patient states that radiates down all the way to his right ankle.  Patient states he has been taking Tylenol as needed for pain control.  Patient does not apply ice, heat, or topical gels.  Patient has not had any formal physical therapy, steroid injection, or surgeries done to his back.  Denies saddle anesthesia.  Denies recent bowel or bladder changes.     ROS:    See HPI for musculoskeletal review.   All other systems reviewed are negative     Historical Information   Past Medical History:   Diagnosis Date    Constipation     Disease of thyroid gland      Past Surgical History:   Procedure Laterality Date    WISDOM TOOTH EXTRACTION       Social History   Social History     Substance and Sexual Activity   Alcohol Use Never     Social History     Substance and Sexual Activity   Drug Use Never     Tobacco Use History[1]  Family History:   Family History   Problem Relation Age of Onset    Hypertension Mother     No Known Problems Father         Cannot walk due to drinking alcohol    Thyroid disease unspecified Sister     No Known Problems Sister     No Known Problems Brother     No Known Problems Daughter     No Known Problems Daughter     No Known Problems Son        Medications Ordered Prior to Encounter[2]  Allergies[3]        Objective   Ht 5' 7\" (1.702 m)   Wt 75.5 kg (166 lb 6.4 oz)   BMI 26.06 kg/m²        PE:  AAOx 3  WDWN  Hearing intact, no drainage from eyes  Regular rate  no audible wheezing  no abdominal distension  LE compartments soft, skin intact    Ortho Exam:  right hip:   No dislocation/deformity  negative Stinchfield  ROM: 110 flexion, 35 external rotation, 20 internal rotation mild pain with internal rotation  negative Refugio Test  negative Impingement test  No  TTP over greater trochanter  Abduction: 5/5  negative Blaine's test  No  TTP over SIJ    rightLE:    EHL/AT/GS/quads/hamstrings/iliopsoas 5/5, sensation " grossly intact L4, L5, S1, palpable pedal pulse    LLE:  EHL/AT/GS/quads/hamstrings/iliopsoas 5/5, sensation grossly intact L4, L5, S1, palpable pedal pulse      Back:    No  TTP over lumbar spinous processes, paraspinal musculature  negative SLR  + slump test    Imaging Studies: Results Review Statement: I personally reviewed the following image studies in PACS and associated radiology reports: xray(s). My interpretation of the radiology images/reports is: L5-S1 spondylolisthesis with mild multilevel degenerative changes.    Procedures     Scribe Attestation      I,:  Timmy Monique am acting as a scribe while in the presence of the attending physician.:       I,:  Jami Sanchez DO personally performed the services described in this documentation    as scribed in my presence.:                   [1]   Social History  Tobacco Use   Smoking Status Never    Passive exposure: Never   Smokeless Tobacco Never   [2]   Current Outpatient Medications on File Prior to Visit   Medication Sig Dispense Refill    Fe Bisgly-Vit C-Vit B12-FA (Gentle Iron) 28-60-0.008-0.4 MG CAPS Take 1 capsule by mouth in the morning 90 capsule 0    levothyroxine 100 mcg tablet Take 1 tablet (100 mcg total) by mouth daily 90 tablet 2    polyethylene glycol (MIRALAX) 17 g packet Take 17 g by mouth daily      psyllium (METAMUCIL) 58.6 % packet Take 1 packet by mouth daily      sildenafil (VIAGRA) 50 MG tablet Take 1 tab PO 1 hour prior to Sexual activity PRN.  Do not exceed 1 tab per day (Patient not taking: Reported on 8/6/2024) 10 tablet 0     No current facility-administered medications on file prior to visit.   [3] No Known Allergies

## 2025-05-20 ENCOUNTER — EVALUATION (OUTPATIENT)
Dept: PHYSICAL THERAPY | Facility: CLINIC | Age: 50
End: 2025-05-20
Attending: ORTHOPAEDIC SURGERY
Payer: COMMERCIAL

## 2025-05-20 DIAGNOSIS — M54.41 ACUTE LOW BACK PAIN WITH RIGHT-SIDED SCIATICA, UNSPECIFIED BACK PAIN LATERALITY: Primary | ICD-10-CM

## 2025-05-20 DIAGNOSIS — M54.16 LUMBAR RADICULOPATHY: ICD-10-CM

## 2025-05-20 PROCEDURE — 97110 THERAPEUTIC EXERCISES: CPT

## 2025-05-20 PROCEDURE — 97161 PT EVAL LOW COMPLEX 20 MIN: CPT

## 2025-05-20 NOTE — PROGRESS NOTES
PT Evaluation     Today's date: 2025  Patient name: Bryn Conroy  : 1975  MRN: [id number]  Referring provider: Jami Sanchez,*  Dx:   Encounter Diagnosis     ICD-10-CM    1. Acute low back pain with right-sided sciatica, unspecified back pain laterality  M54.41       2. Lumbar radiculopathy  M54.16                    Assessment  Impairments: abnormal gait, abnormal muscle tone, abnormal or restricted ROM, activity intolerance, impaired physical strength, lacks appropriate home exercise program, pain with function, poor posture , poor body mechanics, participation limitations, activity limitations and endurance  Symptom irritability: moderate    Assessment details: Bryn Conroy is a 50 y.o. male who presents to PT with complaints of R sided LE and low back pain that began a few months ago. Based on objective evaluation, pt presents with acute onset of low back pain and radiculopathy into RLE. Pt demonstrates decreased ROM in lumbar spine 2/2 hypomobility and tenderness/tightness in surrounding musculature. He also demonstrates decreased hip mobility and ROM in multiple directions BL. Pt also has core weakness and endurance deficits as well as decreased hip strength. These impairments are limiting patient's functional ability to perform ADLs, IADLs, and recreational activities. Pt would benefit from skilled physical therapy to address the limitations above to allow return to prior level of function. At this point in time, no further referral necessary based upon examination results.   Barriers to therapy: None  Understanding of Dx/Px/POC: good     Prognosis: good    Goals  Short term goals 2-3 weeks    1) Patient will demonstrate ability to perform HEP.   2) Patient will be able to complete his commute to work with improved sitting posture and improved pain by 50%.  3) Patient will be able to stand for at least 45 min at work using his standing desk to assist with pain levels from prolonged  sitting during the day.    Long term goals 4-8 weeks    1) Patient will demonstrate independence with comprehensive HEP.   2) Patient improve FOTO score to equal or greater than expected values.   3) Patient will demonstrate ability to bend forward to stretch prior to running and pick something up off the ground with improved mobility and less than 2/10 pain.   4) Patient will demonstrate ability to run at least 3 miles with less than 2/10 pain.   5) Pt will increase glute strength to at least 4/5 BL to improve walking and running mechanics.    Plan  Patient would benefit from: skilled physical therapy and home program    Planned therapy interventions: joint mobilization, manual therapy, body mechanics training, flexibility, home exercise program, therapeutic exercise, stretching, strengthening, postural training, patient/caregiver education, neuromuscular re-education, abdominal trunk stabilization and nerve gliding    Frequency: 1-2x week  Duration in weeks: 8  Plan of Care beginning date: 5/20/2025  Plan of Care expiration date: 7/15/2025  Treatment plan discussed with: patient        Subjective: Pt is a 51 y/o M who presents to PT with complaints of RLE and low back pain that has been ongoing for about 2-3 months now. Pt reports that he drives about 2-2.5 hrs back and forth to work at least 2-3x/wk, and feels like it started when he was doing that. Pt reports it felt like he had a cramp in his RLE at first and then eventually he started to get shooting pain into his RLE, more towards posterior aspect. Pt reports if he is sitting for too long, that is the pain he is getting most often. He notes when it first happened, he didn't change anything about his activities and was able to push through the pain - still commuting to work, running (main form of activity), etc. Pt reports he does have trouble using his RLE when driving occasionally though and has to take breaks. He notes he started doing sciatica exercises  from [employer] a few weeks ago, and feels like it helped his pain levels overall though. Pt reports cramping has seemed to stop mostly, but he still has pain in RLE. Notes his back has also gotten more stiff and tender but that is more intermittent. Denies numbness/tingling. Has 3 children, which he assists in taking care of - one in middle school, two in HS.    Most aggravating: sitting for prolonged periods, driving    Alleviating: physician gave him muscle relaxers which have helped - was also recommended steroid course but did not take yet    Imaging: x-ray completed in May - showed Grade 1 retrolisthesis of L4 on L5. Grade 1 anterolisthesis of L5 on S1    Recreational activities: PT exercises from [employer] including HS stretches and sciatic nerve glide version; used to run mostly for exercise - has run marathons - avg runs are 7-8 miles with long runs being ~20 miles, was running mostly every day - outside, TM, and interval training; no strengthening or stretching really     Occupation:  - sits a lot during his day at work - has a standing desk at work but not at home    Barriers to care: none    Goals: improve pain levels, increase strength, increase mobility and flexibility, decrease injury risk, be able to sit and drive without pain      Objective     Static Posture     Shoulders  Rounded.    Lumbar Spine   Decreased lordosis.     Pelvis   Anterior pelvic tilt    Palpation   Left   Hypertonic in the adductor brevis, adductor longus, adductor amy, erector spinae, iliopsoas, lumbar paraspinals, proximal biceps femoris, proximal semimembranosus, proximal semitendinosus, quadratus lumborum and TFL.   Tenderness of the erector spinae and lumbar paraspinals.     Right   Hypertonic in the adductor brevis, adductor longus, adductor amy, erector spinae, gluteus medius, iliopsoas, lumbar paraspinals, piriformis, proximal biceps femoris, proximal semimembranosus, proximal semitendinosus, quadratus  lumborum and TFL.   Tenderness of the erector spinae, lumbar paraspinals, piriformis, proximal biceps femoris, proximal semimembranosus and proximal semitendinosus.     Active Range of Motion   Cervical/Thoracic Spine       Thoracic    Flexion:  Restriction level: minimal  Extension:  Restriction level: minimal    Lumbar   Flexion:  Restriction level: moderate  Extension:  Restriction level: minimal  Left lateral flexion:  Restriction level: minimal  Right lateral flexion:  Restriction level: minimal  Left rotation:  Restriction level: minimal  Right rotation:  Restriction level: minimal  Left Hip   Flexion: WFL  Extension: Left hip active extension: minimally restricted.   Abduction: WFL  Adduction: WFL  External rotation (90/90): Left hip active external rotation 90/90: moderately limited.   Internal rotation (90/90): Left hip active internal rotation 90/90: minimally restricted.     Right Hip   Flexion: WFL  Extension: Right hip active extension: moderately restricted. with pain  Abduction: WFL  Adduction: WFL  External rotation (90/90): Right hip active external rotation 90/90: moderately restricted.   Internal rotation (90/90): Right hip active internal rotation 90/90: minimally restricted.   Left Knee   Flexion: WFL  Extension: WFL    Right Knee   Flexion: WFL  Extension: WFL    Joint Play   Left Hip   Joints within functional limits are the posterior hip capsule, anterior hip capsule and long axis distraction. Hypomobile in the lateral hip capsule.    Right Hip     Hypomobile in the posterior hip capsule, anterior hip capsule and lateral hip capsule  Joints within functional limits: L5 and S1     Hypomobile: T8, T9, T10, T11, T12, L1, L2, L3 and L4     Pain: L4 and L5     Strength/Myotome Testing     Left Hip   Planes of Motion   Flexion: 4-  Extension: 4  Abduction: 4-  External rotation: 3+    Right Hip   Planes of Motion   Flexion: 4-  Extension: 4-  Abduction: 4-  External rotation: 3+    Left Knee  "  Flexion: 4-  Extension: 4+    Right Knee   Flexion: 4-  Extension: 4+    Tests     Left Hip   Yazan: Positive.   Modified Yazan: Positive.   90/90 SLR: Positive.     Right Hip   Yazan: Positive.   Modified Yazan: Positive.   90/90 SLR: Positive.   SLR: Positive.     Ambulation     Observational Gait   Gait: antalgic   Decreased walking speed and stride length.     Functional Assessment        Comments  Squat: decreased core activation, decreased glute activation, anterior translation of knees over toes BL, decreased hip hinge, slight dynamic knee valgus BL      Flowsheet Rows      Flowsheet Row Most Recent Value   PT/OT G-Codes    Current Score 65   Projected Score 78               Precautions: None      Manuals 5/20                                                                Neuro Re-Ed             Sciatic nerve glides 1x10                                                                                          Ther Ex             HS stretch 1x30\", seated and supine            PPUs 5x5-10\"            Figure 4 stretch 1x30\" ea            Bridges w/ TA activation 1x10                                                                Ther Activity                                       Gait Training                                       Modalities                                              "

## 2025-05-29 ENCOUNTER — OFFICE VISIT (OUTPATIENT)
Dept: PAIN MEDICINE | Facility: MEDICAL CENTER | Age: 50
End: 2025-05-29
Payer: COMMERCIAL

## 2025-05-29 VITALS — WEIGHT: 166 LBS | BODY MASS INDEX: 26.06 KG/M2 | HEIGHT: 67 IN

## 2025-05-29 DIAGNOSIS — M54.16 RIGHT LUMBAR RADICULITIS: Primary | ICD-10-CM

## 2025-05-29 PROCEDURE — 99204 OFFICE O/P NEW MOD 45 MIN: CPT | Performed by: PHYSICAL MEDICINE & REHABILITATION

## 2025-05-29 RX ORDER — GABAPENTIN 300 MG/1
300 CAPSULE ORAL 3 TIMES DAILY
Qty: 90 CAPSULE | Refills: 1 | Status: SHIPPED | OUTPATIENT
Start: 2025-05-29 | End: 2025-07-28

## 2025-05-29 NOTE — PROGRESS NOTES
Name: Bryn Conroy      : 1975      MRN: [id number]  Encounter Provider: Bethel Schulz DO  Encounter Date: 2025   [facility]  :  Assessment & Plan  Right lumbar radiculitis    Orders:    gabapentin (NEURONTIN) 300 mg capsule; Take 1 capsule (300 mg total) by mouth 3 (three) times a day 1 QHS x 3 days, 1 BID x 3 days, then 1 TID    Start gabapentin  Continue with physical therapy  Follow-up in 6 weeks, sooner if necessary  Consider MRI of the lumbar spine at follow-up if continuing to have symptoms        My impressions and treatment recommendations were discussed in detail with the patient who verbalized understanding and had no further questions.  Discharge instructions were provided. I personally saw and examined the patient and I agree with the above discussed plan of care.    History of Present Illness     Bryn Conroy is a 50 y.o. male seen in consultation at the request of Dr. Sanchez regarding back and radiating right leg pain which has been present for about 3 months without any specific inciting events or trauma.  Pain is rated as a 7/10 and intermittent in nature.  This is characterized as burning cramping shooting sharp and pins and needle sensation throughout the right lower extremity in an L5 or S1 distribution on the right.    Patient denies weakness.  No bowel or bladder incontinence.  No saddle anesthesia.    He does continue to participate in training for marathons.  He notes pain especially when driving for long distances as well.    Review of Systems   Constitutional:  Positive for unexpected weight change. Negative for fatigue.   HENT:  Negative for ear pain, hearing loss and sinus pain.    Eyes:  Negative for redness and visual disturbance.   Respiratory:  Negative for shortness of breath and wheezing.    Cardiovascular:  Negative for chest pain and palpitations.   Gastrointestinal:  Negative for abdominal pain, diarrhea and rectal  "pain.   Endocrine: Negative for polydipsia and polyuria.   Genitourinary:  Negative for difficulty urinating and frequency.   Musculoskeletal:  Positive for back pain. Negative for gait problem and myalgias.   Skin:  Negative for rash.   Allergic/Immunologic: Negative for food allergies.   Neurological:  Negative for numbness and headaches.   Psychiatric/Behavioral:  Negative for decreased concentration and sleep disturbance. The patient is not nervous/anxious.      Medical History Reviewed by provider this encounter:     .       Objective   Ht 5' 7\" (1.702 m)   Wt 75.3 kg (166 lb)   BMI 26.00 kg/m²      Pain Score:   7  Physical Exam  Constitutional: normal, well developed, well nourished, alert, in no distress and non-toxic and no overt pain behavior.  Eyes: anicteric  HEENT: grossly intact  Neck: supple, symmetric, trachea midline and no masses   Pulmonary:even and unlabored  Cardiovascular:No edema or pitting edema present  Psychiatric:Mood and affect appropriate  Neurologic:Cranial Nerves II-XII grossly intact, bilateral lower extremity strength is normal, bilateral lower extremity muscle stretch reflexes are physiologic and symmetric at the knees and ankles, negative ankle clonus, positive straight leg raise from a seated position on the right  Musculoskeletal:normal          XR SPINE LUMBAR MINIMUM 4 VIEWS NON INJURY     INDICATION: M54.50: Low back pain, unspecified.     COMPARISON: None     FINDINGS:     No acute fracture. Intact pedicles.     Five non-rib-bearing lumbar vertebral bodies.     Grade 1 retrolisthesis of L4 on L5. Grade 1 anterolisthesis of L5 on S1.     Facet arthropathy of the lower lumbar spine.     Unremarkable soft tissues.     IMPRESSION:     No acute osseous abnormality.     Degenerative changes as described.      "

## 2025-05-30 ENCOUNTER — OFFICE VISIT (OUTPATIENT)
Dept: PHYSICAL THERAPY | Facility: CLINIC | Age: 50
End: 2025-05-30
Attending: ORTHOPAEDIC SURGERY
Payer: COMMERCIAL

## 2025-05-30 DIAGNOSIS — M54.41 ACUTE LOW BACK PAIN WITH RIGHT-SIDED SCIATICA, UNSPECIFIED BACK PAIN LATERALITY: Primary | ICD-10-CM

## 2025-05-30 DIAGNOSIS — M54.16 LUMBAR RADICULOPATHY: ICD-10-CM

## 2025-05-30 PROCEDURE — 97140 MANUAL THERAPY 1/> REGIONS: CPT

## 2025-05-30 PROCEDURE — 97110 THERAPEUTIC EXERCISES: CPT

## 2025-05-30 NOTE — PROGRESS NOTES
"Daily Note     Today's date: 2025  Patient name: Bryn Conroy  : 1975  MRN: [id number]  Referring provider: Jami Sanchez,*  Dx:   Encounter Diagnosis     ICD-10-CM    1. Acute low back pain with right-sided sciatica, unspecified back pain laterality  M54.41       2. Lumbar radiculopathy  M54.16                  Subjective: Pt reports he was able to run about 3x this past week for a total of around 7 miles each. Notes that he had increased sciatic symptoms towards the end of each of his runs, but was able to continue. Also notes that he got some hip and sciatic pain when sleeping. Pt reports physician started him on gabapentin given nerve symptoms. He started taking it yesterday.       Objective: See treatment diary below      Assessment: Pt demonstrates good tolerance to session today and is able to complete all exercises with no reports of increased pain within session. We focused on mobility work today to improve overall lumbopelvic motion as well as muscular tightness in proximal BLE, which he is most limited by at this time. Pt and PT discuss limiting running at this time to short distances at this time as he is able to walk and swim for exercise to supplement his activity, which he agrees to now that he has started medication as well. Pt and PT also reviewed sciatic nerve glides for positioning and appropriate hold times. Pt would benefit from continued skilled PT to address deficits to allow him to return to PLOF.      Plan: Continue per plan of care.  Progress treatment as tolerated.       Precautions: None      Manuals            STM  BL lumbar paraspinals, QL; R HS, ITB, TFL                                                  Neuro Re-Ed             Sciatic nerve glides 1x10 2x10x3\"                                                                                         Ther Ex             HS stretch 1x30\", seated and supine 3x40\" ea           PPUs 5x5-10\"            Figure 4 " "stretch 1x30\" matthew            Bridges w/ TA activation 1x10 Green TB, 2x10           bike  7 min           LTRs  10x10\" matthew           Pt education  SG                        Ther Activity                                       Gait Training                                       Modalities                                                   "

## 2025-06-06 ENCOUNTER — APPOINTMENT (OUTPATIENT)
Dept: PHYSICAL THERAPY | Facility: CLINIC | Age: 50
End: 2025-06-06
Attending: ORTHOPAEDIC SURGERY
Payer: COMMERCIAL

## 2025-06-13 ENCOUNTER — APPOINTMENT (OUTPATIENT)
Dept: PHYSICAL THERAPY | Facility: CLINIC | Age: 50
End: 2025-06-13
Attending: ORTHOPAEDIC SURGERY
Payer: COMMERCIAL

## 2025-06-20 ENCOUNTER — OFFICE VISIT (OUTPATIENT)
Dept: PHYSICAL THERAPY | Facility: CLINIC | Age: 50
End: 2025-06-20
Attending: ORTHOPAEDIC SURGERY
Payer: COMMERCIAL

## 2025-06-20 DIAGNOSIS — M54.16 LUMBAR RADICULOPATHY: ICD-10-CM

## 2025-06-20 DIAGNOSIS — M54.41 ACUTE LOW BACK PAIN WITH RIGHT-SIDED SCIATICA, UNSPECIFIED BACK PAIN LATERALITY: Primary | ICD-10-CM

## 2025-06-20 PROCEDURE — 97110 THERAPEUTIC EXERCISES: CPT

## 2025-06-20 PROCEDURE — 97140 MANUAL THERAPY 1/> REGIONS: CPT

## 2025-06-20 PROCEDURE — 97112 NEUROMUSCULAR REEDUCATION: CPT

## 2025-06-20 NOTE — PROGRESS NOTES
Daily Note     Today's date: 2025  Patient name: Bryn Conroy  : 1975  MRN: [id number]  Referring provider: Jami Sanchez,*  Dx:   Encounter Diagnosis     ICD-10-CM    1. Acute low back pain with right-sided sciatica, unspecified back pain laterality  M54.41       2. Lumbar radiculopathy  M54.16                 Subjective: Pt reports he has fully taken a break from running since his last session 3 weeks ago as he felt it was making his pain worse afterwards. Pt is also noting that he gets more pain after driving more each week. Notes last week he had to drive to the office 5x, and had more pain than this week when he only drove 1x. Pt reports that he is going to look into getting a note from his doctor to drive less for work for now. He notes that pain in his hip has been most bothersome this last week, with pt and PT agreeing he is likely experiencing piriformis syndrome symptoms.      Objective: See treatment diary below      Assessment: Pt demonstrates good tolerance to session today and is able to complete all exercises with no reports of increased pain within session. We focused on mobility work today to improve overall proximal RLE tightness, which is main complaint and symptom at this time. Pt and PT discuss continued limitations with running and to progress back to swimming and biking as tolerated in addition to his exercises. Pt and PT also discussed getting a note from physician to decrease the amount of times he has to drive into the office at this time as prolonged sitting has also proven to be detrimental to his symptoms. PT also provided education on appropriate levels of discomfort throughout mostly stretches, but all exercises. Pt would benefit from continued skilled PT to address deficits to allow him to return to PLOF.      Plan: Continue per plan of care.  Progress treatment as tolerated.       Precautions: None      Manuals           STM  BL lumbar  "paraspinals, QL; R HS, ITB, TFL R HS, ITB, TFL, glute med, pirirforis                                                 Neuro Re-Ed             Sciatic nerve glides 1x10 2x10x3\" 2x10                                                                                        Ther Ex             HS stretch 1x30\", seated and supine 3x40\" ea 3x30\" ea          PPUs 5x5-10\"            Figure 4 stretch 1x30\" ea  3x30\" ea          Bridges w/ TA activation 1x10 Green TB, 2x10 Green TB, 3x10          bike  7 min           LTRs  10x10\" ea           Pt education  SG SG          QL stretch   2x30\" ea                                    Ther Activity                                       Gait Training                                       Modalities                                          "

## 2025-06-27 ENCOUNTER — OFFICE VISIT (OUTPATIENT)
Dept: PHYSICAL THERAPY | Facility: CLINIC | Age: 50
End: 2025-06-27
Attending: ORTHOPAEDIC SURGERY
Payer: COMMERCIAL

## 2025-06-27 DIAGNOSIS — M54.41 ACUTE LOW BACK PAIN WITH RIGHT-SIDED SCIATICA, UNSPECIFIED BACK PAIN LATERALITY: Primary | ICD-10-CM

## 2025-06-27 DIAGNOSIS — M54.16 LUMBAR RADICULOPATHY: ICD-10-CM

## 2025-06-27 PROCEDURE — 97112 NEUROMUSCULAR REEDUCATION: CPT

## 2025-06-27 PROCEDURE — 97140 MANUAL THERAPY 1/> REGIONS: CPT

## 2025-06-27 PROCEDURE — 97110 THERAPEUTIC EXERCISES: CPT

## 2025-06-27 NOTE — PROGRESS NOTES
Daily Note     Today's date: 2025  Patient name: Bryn Conroy  : 1975  MRN: [id number]  Referring provider: Jami Sanchez,*  Dx:   Encounter Diagnosis     ICD-10-CM    1. Acute low back pain with right-sided sciatica, unspecified back pain laterality  M54.41       2. Lumbar radiculopathy  M54.16                   Subjective: Pt reports he has been feeling a little better this past week. Notes that he started swimming and was able to go 3 days in a row without having much difficulty. Notes that he had some discomfort afterwards but stretched a little and he was fine. He continues to report prolonged sitting appears to be most painful.      Objective: See treatment diary below      Assessment: Pt demonstrates good tolerance to session today and is able to complete all exercises with no reports of increased pain within session. We focused on mobility work today to improve overall proximal RLE tightness, which is main complaint and symptom at this time. He also demonstrates tightness through lumbopelvic region, so continued with QL stretch as well to improve mobility. Pt and PT discuss continued importance of standing rest breaks, participation in swimming and doing stretches multiple times daily as those have seemed to help him thus far. Pt is getting MRI and a note from physician after that to allow him to go into work less, so he doesn't have to drive as much, which exacerbates his symptoms. He required more rest breaks during session today with discomfort from muscle stretches. PT also provided education again on appropriate levels of discomfort throughout mostly stretches, but all exercises. Pt would benefit from continued skilled PT to address deficits to allow him to return to PLOF.      Plan: Continue per plan of care.  Progress treatment as tolerated.       Precautions: None      Manuals          STM  BL lumbar paraspinals, QL; R HS, ITB, TFL R HS, ITB, TFL, glute med,  "pirirforis R HS, ITB, TFL, glute med, piriformis                                                Neuro Re-Ed             Sciatic nerve glides 1x10 2x10x3\" 2x10 2x10                                                                                       Ther Ex             HS stretch 1x30\", seated and supine 3x40\" ea 3x30\" ea 3x40\" ea         PPUs 5x5-10\"            Figure 4 stretch 1x30\" ea  3x30\" ea 3x40\" ea         Bridges w/ TA activation 1x10 Green TB, 2x10 Green TB, 3x10          bike  7 min  5 min         LTRs  10x10\" ea           Pt education  SG SG SG         QL stretch   2x30\" ea 2x30\" ea                                   Ther Activity                                       Gait Training                                       Modalities                                          "

## 2025-07-02 ENCOUNTER — OFFICE VISIT (OUTPATIENT)
Dept: PHYSICAL THERAPY | Facility: CLINIC | Age: 50
End: 2025-07-02
Attending: ORTHOPAEDIC SURGERY
Payer: COMMERCIAL

## 2025-07-02 DIAGNOSIS — M54.16 LUMBAR RADICULOPATHY: ICD-10-CM

## 2025-07-02 DIAGNOSIS — M54.41 ACUTE LOW BACK PAIN WITH RIGHT-SIDED SCIATICA, UNSPECIFIED BACK PAIN LATERALITY: Primary | ICD-10-CM

## 2025-07-02 PROCEDURE — 97110 THERAPEUTIC EXERCISES: CPT

## 2025-07-02 PROCEDURE — 97112 NEUROMUSCULAR REEDUCATION: CPT

## 2025-07-02 PROCEDURE — 97140 MANUAL THERAPY 1/> REGIONS: CPT

## 2025-07-02 NOTE — PROGRESS NOTES
Daily Note     Today's date: 2025  Patient name: Bryn Conroy  : 1975  MRN: [id number]  Referring provider: Jami Sanchez,*  Dx:   Encounter Diagnosis     ICD-10-CM    1. Acute low back pain with right-sided sciatica, unspecified back pain laterality  M54.41       2. Lumbar radiculopathy  M54.16                 Subjective: Pt reports he has been feeling a little better this past week despite having to drive into the city 3 times in a row. Notes that he is going on vacation for a few days starting today and is looking forward to the break, although he will have to drive 6 hours each way. He is then going to ask his physician for a note to excuse him from going to the office when he gets back as it consistently irritates him despite his improvements.      Objective: See treatment diary below      Assessment: Pt demonstrates good tolerance to session today and is able to complete all exercises with no reports of increased pain within session. We focused on mobility work today to improve overall proximal RLE tightness, which is main complaint and symptom at this time, particularly along TFL and piriformis. He also demonstrates tightness through lumbopelvic region, so continued with QL stretch as well to improve mobility. Pt is getting MRI and a note from physician after vacation that to allow him to go into work less, so he doesn't have to drive as much, which exacerbates his symptoms, so plan to check in after appt with physician to determine his activity levels. Pt able to tolerate more exercises today with no increased pain noted. Continues to require verbal cues to relax into stretches and maintain abdominal brace as needed to assist with proper alignment. Pt would benefit from continued skilled PT to address deficits to allow him to return to PLOF.      Plan: Continue per plan of care.  Progress treatment as tolerated.       Precautions: None      Manuals         [facility]  BL  "lumbar paraspinals, QL; R HS, ITB, TFL R HS, ITB, TFL, glute med, pirirforis R HS, ITB, TFL, glute med, piriformis R HS, ITB, TFL, piriformis                                               Neuro Re-Ed             Sciatic nerve glides 1x10 2x10x3\" 2x10 2x10 2x10        Supine heel taps     1x15 ea                                                                         Ther Ex             HS stretch 1x30\", seated and supine 3x40\" ea 3x30\" ea 3x40\" ea 3x40\" ea        PPUs 5x5-10\"            Figure 4 stretch 1x30\" ea  3x30\" ea 3x40\" ea 3x40\" ea        Bridges w/ TA activation 1x10 Green TB, 2x10 Green TB, 3x10  W/ ball, 2x10        bike  7 min  5 min 6 min, lvl 2        LTRs  10x10\" ea           Pt education  SG SG SG SG        QL stretch   2x30\" ea 2x30\" ea 2x30\" ea                                  Ther Activity                                       Gait Training                                       Modalities                                          "

## 2025-07-10 ENCOUNTER — OFFICE VISIT (OUTPATIENT)
Dept: PHYSICAL THERAPY | Facility: CLINIC | Age: 50
End: 2025-07-10
Attending: ORTHOPAEDIC SURGERY
Payer: COMMERCIAL

## 2025-07-10 DIAGNOSIS — M54.41 ACUTE LOW BACK PAIN WITH RIGHT-SIDED SCIATICA, UNSPECIFIED BACK PAIN LATERALITY: Primary | ICD-10-CM

## 2025-07-10 DIAGNOSIS — M54.16 LUMBAR RADICULOPATHY: ICD-10-CM

## 2025-07-10 PROCEDURE — 97140 MANUAL THERAPY 1/> REGIONS: CPT

## 2025-07-10 PROCEDURE — 97110 THERAPEUTIC EXERCISES: CPT

## 2025-07-10 PROCEDURE — 97112 NEUROMUSCULAR REEDUCATION: CPT

## 2025-07-10 NOTE — PROGRESS NOTES
"Daily Note     Today's date: 7/10/2025  Patient name: Bryn Conroy  : 1975  MRN: [id number]  Referring provider: Jami Sanchez,*  Dx:   Encounter Diagnosis     ICD-10-CM    1. Acute low back pain with right-sided sciatica, unspecified back pain laterality  M54.41       2. Lumbar radiculopathy  M54.16                      Subjective: Patient states he is only slightly better, but his main concern is his R hip and ankle pain. He can stand for long periods of time, but sitting is more painful or getting up in the morning. Patient's doctor wanted him to follow up after 6 visits if he is not improving enough.       Objective: See treatment diary below      Assessment: Tolerated treatment fair. Reviewed HEP with patient and issued Blue T band for HEP as well. No other increased symptoms post session , but still having some hip discomfort R side and aches in patients B elbows too. Patient is to follow up with the doctor , but he may want to come in for PT another time before and talk to his PT as well. Patient would benefit from continued PT      Plan: Continue per plan of care.  PT follow up with patient if he does not follow up with her.      Precautions: None      Manuals 5/20 5/30 6/20 6/27 7/2 7/10       STM  BL lumbar paraspinals, QL; R HS, ITB, TFL R HS, ITB, TFL, glute med, pirirforis R HS, ITB, TFL, glute med, piriformis R HS, ITB, TFL, piriformis GF x 15 min L side ly  And roller on hip as well - gentle                                               Neuro Re-Ed             Sciatic nerve glides 1x10 2x10x3\" 2x10 2x10 2x10 2 x 10        Supine heel taps     1x15 ea                                                                         Ther Ex             HS stretch 1x30\", seated and supine 3x40\" ea 3x30\" ea 3x40\" ea 3x40\" ea 3 x 40\" ea       PPUs 5x5-10\"            Figure 4 stretch 1x30\" ea  3x30\" ea 3x40\" ea 3x40\" ea 3 x 40\" ea       Bridges w/ TA activation 1x10 Green TB, 2x10 Green TB, 3x10 " " W/ ball, 2x10 W/ ball 3 x 10       bike  7 min  5 min 6 min, lvl 2 X 6 min L 2        LTRs  10x10\" ea           Pt education  SG SG SG SG GF       QL stretch   2x30\" ea 2x30\" ea 2x30\" ea 30\" x 4 ea                                 Ther Activity                                       Gait Training                                       Modalities                                               "

## 2025-07-17 ENCOUNTER — OFFICE VISIT (OUTPATIENT)
Dept: PHYSICAL THERAPY | Facility: CLINIC | Age: 50
End: 2025-07-17
Attending: ORTHOPAEDIC SURGERY
Payer: COMMERCIAL

## 2025-07-17 DIAGNOSIS — M54.41 ACUTE LOW BACK PAIN WITH RIGHT-SIDED SCIATICA, UNSPECIFIED BACK PAIN LATERALITY: Primary | ICD-10-CM

## 2025-07-17 DIAGNOSIS — M54.16 LUMBAR RADICULOPATHY: ICD-10-CM

## 2025-07-17 PROCEDURE — 97140 MANUAL THERAPY 1/> REGIONS: CPT

## 2025-07-17 PROCEDURE — 97110 THERAPEUTIC EXERCISES: CPT

## 2025-07-17 NOTE — PROGRESS NOTES
"Daily Note     Today's date: 2025  Patient name: Bryn Conroy  : 1975  MRN: [id number]  Referring provider: Jami Sanchez,*  Dx:   Encounter Diagnosis     ICD-10-CM    1. Acute low back pain with right-sided sciatica, unspecified back pain laterality  M54.41       2. Lumbar radiculopathy  M54.16                    Subjective: Pt reports that he is doing better this week as he has not been driving or sitting as much. Notes that his pain is improving in terms of his leg, but he is now having more pain in his R hip towards TFL and piriformis this week. Pt reports that he is following up with his doctor on Wednesday next week to determine next steps.      Objective: See treatment diary below      Assessment: Tolerated treatment well today. Noted no increased in pain during session or any of the activities despite additions and increases in repetitions. Pt and PT reviewed lacrosse ball roll outs to assist with increased muscle tension in hip on R side, mostly in lateral and posterior aspect. Pt noted good benefit from manual tx and roll outs, so encouraged him to complete at home. Pt demonstrates improved TA activation with verbal cues for \"belly button to spine\" and gentle contraction to maintain normal breathing, so plan to continue working on to assist with lower back tension and protection. Pt is following up with physician next week and will likely get an MRI as well as a note to work from home at this time, so plan to modify PT POC as needed. Patient would benefit from continued PT.      Plan: Continue per plan of care.  PT follow up with patient if he does not follow up with her.      Precautions: None      Manuals 5/20 5/30 6/20 6/27 7/2 7/10 7/17      STM  BL lumbar paraspinals, QL; R HS, ITB, TFL R HS, ITB, TFL, glute med, pirirforis R HS, ITB, TFL, glute med, piriformis R HS, ITB, TFL, piriformis GF x 15 min L side ly  And roller on hip as well - gentle  R HS, TFL, piriformis, glute max, " "lumbar paraspinals      Lacrosse ball roll outs       reviewed                                Neuro Re-Ed             Sciatic nerve glides 1x10 2x10x3\" 2x10 2x10 2x10 2 x 10        Supine heel taps     1x15 ea        Supine marches w/ serratus punch       3#, 2x10 ea                                                          Ther Ex             HS stretch 1x30\", seated and supine 3x40\" ea 3x30\" ea 3x40\" ea 3x40\" ea 3 x 40\" ea 3x30\" ea      PPUs 5x5-10\"            Figure 4 stretch 1x30\" ea  3x30\" ea 3x40\" ea 3x40\" ea 3 x 40\" ea       Bridges w/ TA activation 1x10 Green TB, 2x10 Green TB, 3x10  W/ ball, 2x10 W/ ball 3 x 10 3x10, w/ ball      bike  7 min  5 min 6 min, lvl 2 X 6 min L 2  6 min, lvl 2      LTRs  10x10\" ea     10x10\"      Pt education  SG SG SG SG GF SG      QL stretch   2x30\" ea 2x30\" ea 2x30\" ea 30\" x 4 ea                                 Ther Activity                                       Gait Training                                       Modalities                                               "

## 2025-07-23 ENCOUNTER — OFFICE VISIT (OUTPATIENT)
Dept: PAIN MEDICINE | Facility: MEDICAL CENTER | Age: 50
End: 2025-07-23
Payer: COMMERCIAL

## 2025-07-23 VITALS — WEIGHT: 167 LBS | HEIGHT: 67 IN | BODY MASS INDEX: 26.21 KG/M2

## 2025-07-23 DIAGNOSIS — M54.16 LUMBAR RADICULITIS: ICD-10-CM

## 2025-07-23 DIAGNOSIS — M25.551 GREATER TROCHANTERIC PAIN SYNDROME OF RIGHT LOWER EXTREMITY: ICD-10-CM

## 2025-07-23 DIAGNOSIS — M25.551 GREATER TROCHANTERIC PAIN SYNDROME OF RIGHT LOWER EXTREMITY: Primary | ICD-10-CM

## 2025-07-23 PROCEDURE — 99214 OFFICE O/P EST MOD 30 MIN: CPT | Performed by: PHYSICAL MEDICINE & REHABILITATION

## 2025-07-23 RX ORDER — TIZANIDINE HYDROCHLORIDE 4 MG/1
4 CAPSULE, GELATIN COATED ORAL 3 TIMES DAILY
Qty: 90 CAPSULE | Refills: 0 | Status: SHIPPED | OUTPATIENT
Start: 2025-07-23 | End: 2025-07-24

## 2025-07-23 NOTE — LETTER
July 23, 2025     Patient: Bryn Conroy  YOB: 1975  Date of Visit: 7/23/2025      To Whom it May Concern:    Bryn Conroy is under my professional care. Bryn was seen in my office on 7/23/2025. Bryn should not drive for greater than 30 minutes at a time at this point.  We will reevaluate return to typical driving commute at a follow up visit.    If you have any questions or concerns, please don't hesitate to call.         Sincerely,          Bethel Schulz,         CC: No Recipients

## 2025-07-23 NOTE — PROGRESS NOTES
Name: Bryn Conroy      : 1975      MRN: [id number]  Encounter Provider: Bethel Schulz DO  Encounter Date: 2025   Encounter [facility]  :  Assessment & Plan  Greater trochanteric pain syndrome of right lower extremity    Orders:    TiZANidine (ZANAFLEX) 4 MG capsule; Take 1 capsule (4 mg total) by mouth 3 (three) times a day    Lumbar radiculitis    Orders:    TiZANidine (ZANAFLEX) 4 MG capsule; Take 1 capsule (4 mg total) by mouth 3 (three) times a day    MRI lumbar spine wo contrast; Future      - Assessment: Symptoms and examination findings are consistent with greater trochanteric pain syndrome. Sciatic-type pain radiation is likely secondary to this, but a lumbar spine etiology cannot be ruled out.  - Investigations planned: MRI of the lumbar spine ordered. Instructed to schedule this only if the planned injection does not provide adequate relief.  - Medical treatment planned: Prescribed tizanidine, a muscle relaxant, to be taken up to twice daily. Advised to start at nighttime due to potential for somnolence. Prescription sent to [facility] pharmacy in [place].  - Non-pharmacological interventions: Discontinue physiotherapy as it has not been helpful.  - Interventional procedures considered: Right greater trochanteric bursa injection. Scheduled for this procedure. Discussed goals of calming down inflammation and providing significant pain relief.  - Lifestyle modifications: Provided a note recommending a work restriction of no driving for more than 30 minutes at a time for the next 3-4 weeks.    Additional Notes:  - Patient education on the diagnosed condition(s): Explained that leg pain can originate from a compressed nerve in the spine without associated back pain.  - Instructions for pain monitoring:  - Any specific patient or family concerns addressed during the consultation: Addressed request for a work note regarding driving limitations.          My  "impressions and treatment recommendations were discussed in detail with the patient who verbalized understanding and had no further questions.  Discharge instructions were provided. I personally saw and examined the patient and I agree with the above discussed plan of care.    History of Present Illness     Bryn Conroy is a 50 y.o. male who presents to [facility] for interval re-evaluation in regards to pain in the right leg, hip, low back.  Patient presents today with pain in right leg that radiates to the foot. Pain is described as Constant. Symptoms are worse at night and when driving. Alleviating factors identifiable by the patient are voltaren. On the numeric pain scale of 1-10, the pain typically increases to max of 10 out of 10, which is currently impacting their quality of life and interferes with their activities of daily living.     - Follow-up for persistent hip pain. Pain has not significantly improved.  - Pain is located in the right hip, exacerbated by workouts and sitting for more than 15-20 minutes. Driving also worsens the pain. Reports a sensation of pain in the lateral right hip area at times. After walking for 20 minutes, reports legs do not move as fast. Pain sometimes radiates down the side of the leg with a sensation of electric currents or sudden spikes. Sitting causes sudden nerve-like pain in the buttock area, which resolves on standing. Standing is comfortable. Reports morning soreness in the hip upon waking. Leg cramps have resolved.  - Current medications: Discontinued gabapentin due to somnolence and lack of efficacy.      Review of Systems    Medical History Reviewed by provider this encounter:     .       Objective   Ht 5' 7\" (1.702 m)   Wt 75.8 kg (167 lb)   BMI 26.16 kg/m²      Pain Score: 10-Worst pain ever  Physical Exam  Constitutional: normal, well developed, well nourished, alert, in no distress and non-toxic and no overt pain behavior.  Eyes: " anicteric  HEENT: grossly intact  Neck: supple, symmetric, trachea midline and no masses   Pulmonary: even and unlabored  Cardiovascular: No edema or pitting edema present  Skin: Normal without rashes or lesions and well hydrated  Psychiatric: Mood and affect appropriate  Neurologic: Cranial Nerves II-XII grossly intact  Musculoskeletal: normal, Tenderness to palpation over the right greater trochanter. Buttock area is also tender to palpation.

## 2025-07-24 RX ORDER — TIZANIDINE HYDROCHLORIDE 4 MG/1
4 CAPSULE, GELATIN COATED ORAL 2 TIMES DAILY PRN
Qty: 60 CAPSULE | Refills: 0 | Status: SHIPPED | OUTPATIENT
Start: 2025-07-24 | End: 2025-08-23

## 2025-08-14 ENCOUNTER — PROCEDURE VISIT (OUTPATIENT)
Dept: PAIN MEDICINE | Facility: MEDICAL CENTER | Age: 50
End: 2025-08-14
Payer: COMMERCIAL